# Patient Record
Sex: MALE | Race: WHITE | NOT HISPANIC OR LATINO | Employment: FULL TIME | URBAN - METROPOLITAN AREA
[De-identification: names, ages, dates, MRNs, and addresses within clinical notes are randomized per-mention and may not be internally consistent; named-entity substitution may affect disease eponyms.]

---

## 2017-08-16 ENCOUNTER — HISTORICAL (OUTPATIENT)
Dept: RADIOLOGY | Facility: HOSPITAL | Age: 32
End: 2017-08-16

## 2018-02-07 ENCOUNTER — HISTORICAL (OUTPATIENT)
Dept: INFUSION THERAPY | Facility: HOSPITAL | Age: 33
End: 2018-02-07

## 2019-12-30 ENCOUNTER — HISTORICAL (OUTPATIENT)
Dept: RADIOLOGY | Facility: HOSPITAL | Age: 34
End: 2019-12-30

## 2020-02-28 ENCOUNTER — HISTORICAL (OUTPATIENT)
Dept: RADIOLOGY | Facility: HOSPITAL | Age: 35
End: 2020-02-28

## 2020-04-28 ENCOUNTER — HISTORICAL (OUTPATIENT)
Dept: RADIOLOGY | Facility: HOSPITAL | Age: 35
End: 2020-04-28

## 2020-08-08 ENCOUNTER — HISTORICAL (OUTPATIENT)
Dept: LAB | Facility: HOSPITAL | Age: 35
End: 2020-08-08

## 2020-08-08 LAB
ABS NEUT (OLG): 4.81 X10(3)/MCL (ref 2.1–9.2)
ALBUMIN SERPL-MCNC: 3.7 GM/DL (ref 3.4–5)
ALBUMIN/GLOB SERPL: 0.9 RATIO (ref 1.1–2)
ALP SERPL-CCNC: 61 UNIT/L (ref 46–116)
ALT SERPL-CCNC: 32 UNIT/L (ref 12–78)
AST SERPL-CCNC: 16 UNIT/L (ref 15–37)
BASOPHILS # BLD AUTO: 0 X10(3)/MCL (ref 0–0.2)
BASOPHILS NFR BLD AUTO: 1 %
BILIRUB SERPL-MCNC: 0.3 MG/DL (ref 0.2–1)
BILIRUBIN DIRECT+TOT PNL SERPL-MCNC: 0.12 MG/DL (ref 0–0.2)
BILIRUBIN DIRECT+TOT PNL SERPL-MCNC: 0.18 MG/DL (ref 0–0.8)
BUN SERPL-MCNC: 14.4 MG/DL (ref 7–18)
CALCIUM SERPL-MCNC: 9.7 MG/DL (ref 8.5–10.1)
CHLORIDE SERPL-SCNC: 101 MMOL/L (ref 98–107)
CHOLEST SERPL-MCNC: 164 MG/DL (ref 0–200)
CHOLEST/HDLC SERPL: 3 {RATIO} (ref 0–5)
CO2 SERPL-SCNC: 30.2 MMOL/L (ref 21–32)
CREAT SERPL-MCNC: 0.9 MG/DL (ref 0.6–1.3)
DEPRECATED CALCIDIOL+CALCIFEROL SERPL-MC: 26.9 NG/ML (ref 6.6–49.9)
EOSINOPHIL # BLD AUTO: 0.2 X10(3)/MCL (ref 0–0.9)
EOSINOPHIL NFR BLD AUTO: 3 %
ERYTHROCYTE [DISTWIDTH] IN BLOOD BY AUTOMATED COUNT: 13 % (ref 11.5–17)
GLOBULIN SER-MCNC: 3.9 GM/DL (ref 2.4–3.5)
GLUCOSE SERPL-MCNC: 95 MG/DL (ref 74–106)
HCT VFR BLD AUTO: 39.8 % (ref 42–52)
HDLC SERPL-MCNC: 54 MG/DL (ref 40–60)
HGB BLD-MCNC: 13.1 GM/DL (ref 14–18)
IMM GRANULOCYTES # BLD AUTO: 0.06 % (ref 0–0.02)
IMM GRANULOCYTES NFR BLD AUTO: 0.8 % (ref 0–0.43)
LDLC SERPL CALC-MCNC: 98 MG/DL (ref 0–129)
LYMPHOCYTES # BLD AUTO: 2 X10(3)/MCL (ref 0.6–4.6)
LYMPHOCYTES NFR BLD AUTO: 26 %
MCH RBC QN AUTO: 31.3 PG (ref 27–31)
MCHC RBC AUTO-ENTMCNC: 32.9 GM/DL (ref 33–36)
MCV RBC AUTO: 95.2 FL (ref 80–94)
MONOCYTES # BLD AUTO: 0.6 X10(3)/MCL (ref 0.1–1.3)
MONOCYTES NFR BLD AUTO: 8 %
NEUTROPHILS # BLD AUTO: 4.81 X10(3)/MCL (ref 1.4–7.9)
NEUTROPHILS NFR BLD AUTO: 62 %
PLATELET # BLD AUTO: 277 X10(3)/MCL (ref 130–400)
PMV BLD AUTO: 10.2 FL (ref 9.4–12.4)
POTASSIUM SERPL-SCNC: 4.6 MMOL/L (ref 3.5–5.1)
PROT SERPL-MCNC: 7.6 GM/DL (ref 6.4–8.2)
RBC # BLD AUTO: 4.18 X10(6)/MCL (ref 4.7–6.1)
SODIUM SERPL-SCNC: 137 MMOL/L (ref 136–145)
TRIGL SERPL-MCNC: 62 MG/DL
TSH SERPL-ACNC: 1.54 MIU/ML (ref 0.36–3.74)
VIT B12 SERPL-MCNC: 361 PG/ML (ref 193–986)
VLDLC SERPL CALC-MCNC: 12 MG/DL
WBC # SPEC AUTO: 7.8 X10(3)/MCL (ref 4.5–11.5)

## 2021-05-04 ENCOUNTER — HISTORICAL (OUTPATIENT)
Dept: RADIOLOGY | Facility: HOSPITAL | Age: 36
End: 2021-05-04

## 2022-04-10 ENCOUNTER — HISTORICAL (OUTPATIENT)
Dept: ADMINISTRATIVE | Facility: HOSPITAL | Age: 37
End: 2022-04-10
Payer: COMMERCIAL

## 2022-04-11 ENCOUNTER — HISTORICAL (OUTPATIENT)
Dept: ADMINISTRATIVE | Facility: HOSPITAL | Age: 37
End: 2022-04-11
Payer: COMMERCIAL

## 2022-04-28 VITALS
BODY MASS INDEX: 44.1 KG/M2 | SYSTOLIC BLOOD PRESSURE: 137 MMHG | WEIGHT: 315 LBS | HEIGHT: 71 IN | DIASTOLIC BLOOD PRESSURE: 86 MMHG | OXYGEN SATURATION: 98 %

## 2022-04-28 VITALS
SYSTOLIC BLOOD PRESSURE: 137 MMHG | WEIGHT: 315 LBS | HEIGHT: 71 IN | OXYGEN SATURATION: 98 % | BODY MASS INDEX: 44.1 KG/M2 | DIASTOLIC BLOOD PRESSURE: 86 MMHG

## 2022-05-04 DIAGNOSIS — F32.A DEPRESSION, UNSPECIFIED DEPRESSION TYPE: Primary | ICD-10-CM

## 2022-05-04 DIAGNOSIS — G47.00 INSOMNIA, UNSPECIFIED TYPE: ICD-10-CM

## 2022-05-04 DIAGNOSIS — I10 HYPERTENSION, UNSPECIFIED TYPE: ICD-10-CM

## 2022-05-04 DIAGNOSIS — R52 PAIN: ICD-10-CM

## 2022-05-04 RX ORDER — IBUPROFEN 800 MG/1
800 TABLET ORAL EVERY 8 HOURS PRN
Qty: 30 TABLET | Refills: 4 | Status: SHIPPED | OUTPATIENT
Start: 2022-05-04 | End: 2023-05-15 | Stop reason: SDUPTHER

## 2022-05-04 RX ORDER — ESCITALOPRAM OXALATE 20 MG/1
20 TABLET ORAL DAILY
Qty: 30 TABLET | Refills: 11 | Status: SHIPPED | OUTPATIENT
Start: 2022-05-04 | End: 2023-04-28

## 2022-05-04 RX ORDER — INDOMETHACIN 25 MG/1
CAPSULE ORAL
COMMUNITY
Start: 2021-11-10 | End: 2022-08-05 | Stop reason: ALTCHOICE

## 2022-05-04 RX ORDER — LISINOPRIL AND HYDROCHLOROTHIAZIDE 20; 25 MG/1; MG/1
TABLET ORAL
COMMUNITY
Start: 2021-05-05 | End: 2022-05-04 | Stop reason: SDUPTHER

## 2022-05-04 RX ORDER — LISINOPRIL AND HYDROCHLOROTHIAZIDE 20; 25 MG/1; MG/1
1 TABLET ORAL DAILY
Qty: 30 TABLET | Refills: 11 | Status: SHIPPED | OUTPATIENT
Start: 2022-05-04 | End: 2023-05-15 | Stop reason: SDUPTHER

## 2022-05-04 RX ORDER — TRAZODONE HYDROCHLORIDE 150 MG/1
150 TABLET ORAL NIGHTLY
Qty: 30 TABLET | Refills: 11 | Status: SHIPPED | OUTPATIENT
Start: 2022-05-04 | End: 2023-04-28

## 2022-05-04 RX ORDER — IBUPROFEN 800 MG/1
TABLET ORAL
COMMUNITY
Start: 2021-05-10 | End: 2022-05-04 | Stop reason: SDUPTHER

## 2022-05-04 RX ORDER — TRAZODONE HYDROCHLORIDE 150 MG/1
TABLET ORAL
COMMUNITY
Start: 2021-09-13 | End: 2022-05-04 | Stop reason: SDUPTHER

## 2022-05-04 RX ORDER — ESCITALOPRAM OXALATE 20 MG/1
20 TABLET ORAL
COMMUNITY
Start: 2022-01-03 | End: 2022-05-04 | Stop reason: SDUPTHER

## 2022-05-20 DIAGNOSIS — Z00.00 WELLNESS EXAMINATION: Primary | ICD-10-CM

## 2022-07-17 ENCOUNTER — HOSPITAL ENCOUNTER (EMERGENCY)
Facility: HOSPITAL | Age: 37
Discharge: HOME | End: 2022-07-17
Attending: FAMILY MEDICINE
Payer: MEDICAID

## 2022-07-17 VITALS
SYSTOLIC BLOOD PRESSURE: 143 MMHG | OXYGEN SATURATION: 95 % | RESPIRATION RATE: 16 BRPM | TEMPERATURE: 98 F | DIASTOLIC BLOOD PRESSURE: 78 MMHG | HEART RATE: 92 BPM

## 2022-07-17 DIAGNOSIS — S93.402A SPRAIN OF LEFT ANKLE, UNSPECIFIED LIGAMENT, INITIAL ENCOUNTER: Primary | ICD-10-CM

## 2022-07-17 DIAGNOSIS — R52 PAIN: ICD-10-CM

## 2022-07-17 PROCEDURE — 99283 EMERGENCY DEPT VISIT LOW MDM: CPT | Mod: 25

## 2022-07-18 NOTE — ED PROVIDER NOTES
Encounter Date: 7/17/2022       History     Chief Complaint   Patient presents with    Ankle Injury     C/o left ankle injury fell through a grating superficial abrasions noted      36-year-old female patient comes in with complaint ankle pain after falling through grating patient otherwise has mild abrasions no evidence of lacerations minimal swelling no other signs of injury.        Review of patient's allergies indicates:  No Known Allergies  No past medical history on file.  No past surgical history on file.  No family history on file.     Review of Systems   Constitutional: Negative for fever.   HENT: Negative for sore throat.    Respiratory: Negative for shortness of breath.    Cardiovascular: Negative for chest pain.   Gastrointestinal: Negative for nausea.   Genitourinary: Negative for dysuria.   Musculoskeletal: Positive for arthralgias and myalgias. Negative for back pain.   Skin: Negative for rash.   Neurological: Negative for weakness.   Hematological: Does not bruise/bleed easily.   All other systems reviewed and are negative.      Physical Exam     Initial Vitals [07/17/22 2155]   BP Pulse Resp Temp SpO2   (!) 143/78 92 16 98.1 °F (36.7 °C) 95 %      MAP       --         Physical Exam    Nursing note and vitals reviewed.  Constitutional: He appears well-developed and well-nourished. He is not diaphoretic. No distress.   HENT:   Head: Normocephalic and atraumatic.   Right Ear: External ear normal.   Left Ear: External ear normal.   Nose: Nose normal.   Mouth/Throat: Oropharynx is clear and moist. No oropharyngeal exudate.   Eyes: Conjunctivae and EOM are normal. Pupils are equal, round, and reactive to light. Right eye exhibits no discharge. Left eye exhibits no discharge.   Neck: Neck supple. No thyromegaly present. No tracheal deviation present. No JVD present.   Normal range of motion.  Cardiovascular: Normal rate, regular rhythm, normal heart sounds and intact distal pulses. Exam reveals no gallop  and no friction rub.    No murmur heard.  Pulmonary/Chest: Breath sounds normal. No stridor. No respiratory distress. He has no wheezes. He has no rhonchi. He has no rales. He exhibits no tenderness.   Abdominal: Abdomen is soft. Bowel sounds are normal. He exhibits no distension. There is no abdominal tenderness. There is no rebound and no guarding.   Musculoskeletal:         General: Tenderness present. No edema. Normal range of motion.      Cervical back: Normal range of motion and neck supple.     Lymphadenopathy:     He has no cervical adenopathy.   Neurological: He is alert and oriented to person, place, and time. He has normal strength and normal reflexes. No cranial nerve deficit or sensory deficit. GCS score is 15. GCS eye subscore is 4. GCS verbal subscore is 5. GCS motor subscore is 6.   Skin: Skin is warm and dry. No rash and no abscess noted. There is erythema.   Psychiatric: He has a normal mood and affect. His behavior is normal. Judgment and thought content normal.         ED Course   Procedures  Labs Reviewed - No data to display       Imaging Results          X-Ray Ankle Complete Left (Preliminary result)  Result time 07/17/22 22:23:58    ED Interpretation by Óscar Rashid MD (07/17/22 22:23:58, [facility] - Emergency Dept, Emergency Medicine)    .sdra                               Medications - No data to display                       Clinical Impression:   Final diagnoses:  [R52] Pain  [S93.402A] Sprain of left ankle, unspecified ligament, initial encounter (Primary)          ED Disposition Condition    Discharge Stable        ED Prescriptions     None        Follow-up Information    None          Óscar Rashid MD  07/17/22 9623

## 2022-08-05 ENCOUNTER — HOSPITAL ENCOUNTER (EMERGENCY)
Facility: HOSPITAL | Age: 37
Discharge: HOME | End: 2022-08-05
Attending: STUDENT IN AN ORGANIZED HEALTH CARE EDUCATION/TRAINING PROGRAM
Payer: MEDICAID

## 2022-08-05 VITALS
SYSTOLIC BLOOD PRESSURE: 123 MMHG | HEIGHT: 69 IN | RESPIRATION RATE: 18 BRPM | BODY MASS INDEX: 46.65 KG/M2 | HEART RATE: 73 BPM | WEIGHT: 315 LBS | OXYGEN SATURATION: 98 % | TEMPERATURE: 98 F | DIASTOLIC BLOOD PRESSURE: 81 MMHG

## 2022-08-05 DIAGNOSIS — S39.012A LUMBAR STRAIN, INITIAL ENCOUNTER: Primary | ICD-10-CM

## 2022-08-05 PROCEDURE — 96372 THER/PROPH/DIAG INJ SC/IM: CPT | Performed by: STUDENT IN AN ORGANIZED HEALTH CARE EDUCATION/TRAINING PROGRAM

## 2022-08-05 PROCEDURE — 99284 EMERGENCY DEPT VISIT MOD MDM: CPT | Mod: 25

## 2022-08-05 PROCEDURE — 63600175 PHARM REV CODE 636 W HCPCS: Performed by: STUDENT IN AN ORGANIZED HEALTH CARE EDUCATION/TRAINING PROGRAM

## 2022-08-05 RX ORDER — KETOROLAC TROMETHAMINE 30 MG/ML
30 INJECTION, SOLUTION INTRAMUSCULAR; INTRAVENOUS
Status: COMPLETED | OUTPATIENT
Start: 2022-08-05 | End: 2022-08-05

## 2022-08-05 RX ORDER — IBUPROFEN 600 MG/1
600 TABLET ORAL 3 TIMES DAILY
Qty: 15 TABLET | Refills: 0 | Status: SHIPPED | OUTPATIENT
Start: 2022-08-05 | End: 2022-08-10

## 2022-08-05 RX ORDER — METHOCARBAMOL 750 MG/1
1500 TABLET, FILM COATED ORAL 2 TIMES DAILY PRN
Qty: 20 TABLET | Refills: 0 | Status: SHIPPED | OUTPATIENT
Start: 2022-08-05 | End: 2022-08-10

## 2022-08-05 RX ADMIN — KETOROLAC TROMETHAMINE 30 MG: 30 INJECTION, SOLUTION INTRAMUSCULAR; INTRAVENOUS at 08:08

## 2022-08-05 NOTE — ED PROVIDER NOTES
"Encounter Date: 8/5/2022       History     Chief Complaint   Patient presents with    Back Pain     Slip and fall Monday felt a pop now having lower left back pain     Pt is a 36-year-old white male past medical history of hypertension morbid obesity who presented to the ER today due to left lower back pain for the last 2 days.  Patient states he works as a  and states that he fell in a flower bed.  Patient states he had no pain at the time body they did feel his back "pop.  Patient states he has no pain at rest and only pain with movement.  Patient states the pain does not radiate.  Patient describes sharp pain.  Patient denies any urinary incontinence, urinary retention, fecal incontinence, saddle anesthesia.  Patient rates the pain an 8/10 at its worst.  Patient has taken Tylenol for without much relief.  Patient states he has been applying icy out with better relief than Tylenol.  Patient denies any fevers, chills, cough, congestion, chest pain, shortness of breath, abdominal pain.        Review of patient's allergies indicates:  No Known Allergies  No past medical history on file.  No past surgical history on file.  No family history on file.  Social History     Tobacco Use    Smoking status: Never Smoker    Smokeless tobacco: Never Used   Substance Use Topics    Alcohol use: Yes    Drug use: Never     Review of Systems   Constitutional: Negative for chills, fatigue and fever.   HENT: Negative for congestion, sore throat and trouble swallowing.    Eyes: Negative for pain and visual disturbance.   Respiratory: Negative for cough, shortness of breath and wheezing.    Cardiovascular: Negative for chest pain and palpitations.   Gastrointestinal: Negative for abdominal pain, blood in stool, constipation, diarrhea, nausea and vomiting.   Genitourinary: Negative for dysuria and hematuria.   Musculoskeletal: Positive for back pain. Negative for myalgias.   Skin: Negative for rash and wound. "   Neurological: Negative for seizures, syncope and headaches.   Psychiatric/Behavioral: Negative for confusion. The patient is not nervous/anxious.        Physical Exam     Initial Vitals [08/05/22 0803]   BP Pulse Resp Temp SpO2   123/81 73 18 97.8 °F (36.6 °C) 98 %      MAP       --         Physical Exam    Nursing note and vitals reviewed.  Constitutional: He appears well-developed and well-nourished. No distress.   HENT:   Head: Normocephalic and atraumatic.   Eyes: Conjunctivae and EOM are normal. Right eye exhibits no discharge. Left eye exhibits no discharge. No scleral icterus.   Neck: No tracheal deviation present.   Normal range of motion.  Cardiovascular: Normal rate, regular rhythm, normal heart sounds and intact distal pulses. Exam reveals no gallop and no friction rub.    No murmur heard.  Pulmonary/Chest: Breath sounds normal. No respiratory distress. He has no wheezes. He has no rhonchi. He has no rales.   Musculoskeletal:         General: Tenderness present. No edema. Normal range of motion.      Cervical back: Normal range of motion.      Comments: No C, T, L-spine tenderness no step-off lesions.  No obvious signs of trauma or deformity on exam.  No rashes.  Tenderness palpation over the L1-L3 left paraspinal muscle region.     Neurological: He is alert. He has normal strength. No cranial nerve deficit.   Skin: Skin is warm and dry. No rash and no abscess noted. No erythema. No pallor.   Psychiatric: His behavior is normal. Judgment normal.         ED Course   Procedures  Labs Reviewed - No data to display       Imaging Results          X-Ray Lumbar Spine Ap And Lateral (Final result)  Result time 08/05/22 09:27:42    Final result by Johnna Encarnacion MD (08/05/22 09:27:42)                 Impression:      No appreciable acute osseous abnormality by plain radiographic evaluation.      Electronically signed by: Johnna Encarnacion  Date:    08/05/2022  Time:    09:27             Narrative:     EXAMINATION:  XR LUMBAR SPINE AP AND LATERAL    CLINICAL HISTORY:  back pain.  TTP Left lumbar paraspinal;    TECHNIQUE:  Three views of the lumbar spine were performed.    COMPARISON:  None.    FINDINGS:  BONES: Vertebral body heights are maintained. Alignment is normal.    DISCS: Disc heights are preserved.    SOFT TISSUES: Regional soft tissues unremarkable.                                 Medications   ketorolac injection 30 mg (30 mg Intramuscular Given 8/5/22 0825)     Medical Decision Making:   Initial Assessment:   Overall well-appearing 36-year-old obese male  Differential Diagnosis:   Lumbar strain, fracture, tendon rupture  ED Management:  Vital signs stable  Denies or red flag symptoms of back pain  Tenderness palpation of the paraspinal muscle region consistent with lumbar strain  X-ray showed no acute osseous abnormalities  Toradol gave significant relief  Will continue ibuprofen 600 mg t.i.d. for 5 days  Robaxin p.r.n.  Rice therapy advised  Return precautions discussed follow-up PCP is recommended                      Clinical Impression:   Final diagnoses:  [S39.012A] Lumbar strain, initial encounter (Primary)          ED Disposition Condition    Discharge Stable        ED Prescriptions     Medication Sig Dispense Start Date End Date Auth. Provider    ibuprofen (ADVIL,MOTRIN) 600 MG tablet Take 1 tablet (600 mg total) by mouth 3 (three) times daily. for 5 days 15 tablet 8/5/2022 8/10/2022 Clemente Acosta MD    methocarbamoL (ROBAXIN) 750 MG Tab Take 2 tablets (1,500 mg total) by mouth 2 (two) times daily as needed (pain). 20 tablet 8/5/2022 8/10/2022 Clemente Acosta MD        Follow-up Information     Follow up With Specialties Details Why Contact Info    [facility] Emergency Medicine  If symptoms worsen [address] [id number] [phone]    AYANA Lopez Nurse Practitioner  If symptoms worsen [address] [facility]  [address]  [place]  [place] [address]  [phone]             Clemente Acosta MD  08/05/22 0910

## 2022-10-24 DIAGNOSIS — Z00.00 WELLNESS EXAMINATION: Primary | ICD-10-CM

## 2022-10-31 ENCOUNTER — LAB VISIT (OUTPATIENT)
Dept: LAB | Facility: HOSPITAL | Age: 37
End: 2022-10-31
Attending: NURSE PRACTITIONER
Payer: MEDICAID

## 2022-10-31 DIAGNOSIS — Z00.00 WELLNESS EXAMINATION: ICD-10-CM

## 2022-10-31 LAB
ALBUMIN SERPL-MCNC: 3.6 GM/DL (ref 3.5–5)
ALBUMIN/GLOB SERPL: 1.1 RATIO (ref 1.1–2)
ALP SERPL-CCNC: 59 UNIT/L (ref 40–150)
ALT SERPL-CCNC: 43 UNIT/L (ref 0–55)
AST SERPL-CCNC: 24 UNIT/L (ref 5–34)
BASOPHILS # BLD AUTO: 0.04 X10(3)/MCL (ref 0–0.2)
BASOPHILS NFR BLD AUTO: 0.5 %
BILIRUBIN DIRECT+TOT PNL SERPL-MCNC: 0.4 MG/DL
BUN SERPL-MCNC: 11.3 MG/DL (ref 8.9–20.6)
CALCIUM SERPL-MCNC: 9.1 MG/DL (ref 8.4–10.2)
CHLORIDE SERPL-SCNC: 98 MMOL/L (ref 98–107)
CHOLEST SERPL-MCNC: 159 MG/DL
CHOLEST/HDLC SERPL: 4 {RATIO} (ref 0–5)
CO2 SERPL-SCNC: 29 MMOL/L (ref 22–29)
CREAT SERPL-MCNC: 0.98 MG/DL (ref 0.73–1.18)
CREAT UR-MCNC: 97.2 MG/DL (ref 63–166)
DEPRECATED CALCIDIOL+CALCIFEROL SERPL-MC: 29.3 NG/ML (ref 30–80)
EOSINOPHIL # BLD AUTO: 0.22 X10(3)/MCL (ref 0–0.9)
EOSINOPHIL NFR BLD AUTO: 2.5 %
ERYTHROCYTE [DISTWIDTH] IN BLOOD BY AUTOMATED COUNT: 13.3 % (ref 11.5–17)
EST. AVERAGE GLUCOSE BLD GHB EST-MCNC: 111.2 MG/DL
GFR SERPLBLD CREATININE-BSD FMLA CKD-EPI: >60 MLS/MIN/1.73/M2
GLOBULIN SER-MCNC: 3.4 GM/DL (ref 2.4–3.5)
GLUCOSE SERPL-MCNC: 100 MG/DL (ref 74–100)
HBA1C MFR BLD: 5.5 %
HCT VFR BLD AUTO: 42.5 % (ref 42–52)
HDLC SERPL-MCNC: 45 MG/DL (ref 35–60)
HGB BLD-MCNC: 13.3 GM/DL (ref 14–18)
IMM GRANULOCYTES # BLD AUTO: 0.06 X10(3)/MCL (ref 0–0.04)
IMM GRANULOCYTES NFR BLD AUTO: 0.7 %
LDLC SERPL CALC-MCNC: 88 MG/DL (ref 50–140)
LYMPHOCYTES # BLD AUTO: 2.07 X10(3)/MCL (ref 0.6–4.6)
LYMPHOCYTES NFR BLD AUTO: 24 %
MCH RBC QN AUTO: 31.4 PG (ref 27–31)
MCHC RBC AUTO-ENTMCNC: 31.3 MG/DL (ref 33–36)
MCV RBC AUTO: 100.5 FL (ref 80–94)
MICROALBUMIN UR-MCNC: 13.7 UG/ML
MICROALBUMIN/CREAT RATIO PNL UR: 14.1 MG/GM CR (ref 0–30)
MONOCYTES # BLD AUTO: 0.64 X10(3)/MCL (ref 0.1–1.3)
MONOCYTES NFR BLD AUTO: 7.4 %
NEUTROPHILS # BLD AUTO: 5.6 X10(3)/MCL (ref 2.1–9.2)
NEUTROPHILS NFR BLD AUTO: 64.9 %
PLATELET # BLD AUTO: 250 X10(3)/MCL (ref 130–400)
PMV BLD AUTO: 10.1 FL (ref 7.4–10.4)
POTASSIUM SERPL-SCNC: 4.2 MMOL/L (ref 3.5–5.1)
PROT SERPL-MCNC: 7 GM/DL (ref 6.4–8.3)
RBC # BLD AUTO: 4.23 X10(6)/MCL (ref 4.7–6.1)
SODIUM SERPL-SCNC: 138 MMOL/L (ref 136–145)
TRIGL SERPL-MCNC: 128 MG/DL (ref 34–140)
TSH SERPL-ACNC: 2.41 UIU/ML (ref 0.35–4.94)
VLDLC SERPL CALC-MCNC: 26 MG/DL
WBC # SPEC AUTO: 8.6 X10(3)/MCL (ref 4.5–11.5)

## 2022-10-31 PROCEDURE — 82306 VITAMIN D 25 HYDROXY: CPT

## 2022-10-31 PROCEDURE — 36415 COLL VENOUS BLD VENIPUNCTURE: CPT

## 2022-10-31 PROCEDURE — 84443 ASSAY THYROID STIM HORMONE: CPT

## 2022-10-31 PROCEDURE — 85025 COMPLETE CBC W/AUTO DIFF WBC: CPT

## 2022-10-31 PROCEDURE — 80053 COMPREHEN METABOLIC PANEL: CPT

## 2022-10-31 PROCEDURE — 83036 HEMOGLOBIN GLYCOSYLATED A1C: CPT

## 2022-10-31 PROCEDURE — 82043 UR ALBUMIN QUANTITATIVE: CPT

## 2022-10-31 PROCEDURE — 80061 LIPID PANEL: CPT

## 2022-11-01 ENCOUNTER — OFFICE VISIT (OUTPATIENT)
Dept: FAMILY MEDICINE | Facility: CLINIC | Age: 37
End: 2022-11-01
Payer: MEDICAID

## 2022-11-01 VITALS
SYSTOLIC BLOOD PRESSURE: 100 MMHG | WEIGHT: 315 LBS | DIASTOLIC BLOOD PRESSURE: 62 MMHG | TEMPERATURE: 98 F | BODY MASS INDEX: 46.65 KG/M2 | OXYGEN SATURATION: 95 % | RESPIRATION RATE: 18 BRPM | HEART RATE: 82 BPM | HEIGHT: 69 IN

## 2022-11-01 DIAGNOSIS — Z79.890 ENCOUNTER FOR MONITORING TESTOSTERONE REPLACEMENT THERAPY: Primary | ICD-10-CM

## 2022-11-01 DIAGNOSIS — Z51.81 ENCOUNTER FOR MONITORING TESTOSTERONE REPLACEMENT THERAPY: Primary | ICD-10-CM

## 2022-11-01 DIAGNOSIS — Z00.00 WELLNESS EXAMINATION: ICD-10-CM

## 2022-11-01 DIAGNOSIS — E55.9 VITAMIN D DEFICIENCY: ICD-10-CM

## 2022-11-01 DIAGNOSIS — G47.30 SLEEP APNEA, UNSPECIFIED TYPE: ICD-10-CM

## 2022-11-01 DIAGNOSIS — R53.83 FATIGUE, UNSPECIFIED TYPE: ICD-10-CM

## 2022-11-01 PROCEDURE — 1160F RVW MEDS BY RX/DR IN RCRD: CPT | Mod: CPTII,,, | Performed by: NURSE PRACTITIONER

## 2022-11-01 PROCEDURE — 3078F PR MOST RECENT DIASTOLIC BLOOD PRESSURE < 80 MM HG: ICD-10-PCS | Mod: CPTII,,, | Performed by: NURSE PRACTITIONER

## 2022-11-01 PROCEDURE — 99395 PR PREVENTIVE VISIT,EST,18-39: ICD-10-PCS | Mod: ,,, | Performed by: NURSE PRACTITIONER

## 2022-11-01 PROCEDURE — 99212 OFFICE O/P EST SF 10 MIN: CPT | Mod: 25,,, | Performed by: NURSE PRACTITIONER

## 2022-11-01 PROCEDURE — 3008F PR BODY MASS INDEX (BMI) DOCUMENTED: ICD-10-PCS | Mod: CPTII,,, | Performed by: NURSE PRACTITIONER

## 2022-11-01 PROCEDURE — 3074F PR MOST RECENT SYSTOLIC BLOOD PRESSURE < 130 MM HG: ICD-10-PCS | Mod: CPTII,,, | Performed by: NURSE PRACTITIONER

## 2022-11-01 PROCEDURE — 99395 PREV VISIT EST AGE 18-39: CPT | Mod: ,,, | Performed by: NURSE PRACTITIONER

## 2022-11-01 PROCEDURE — 4010F PR ACE/ARB THEARPY RXD/TAKEN: ICD-10-PCS | Mod: CPTII,,, | Performed by: NURSE PRACTITIONER

## 2022-11-01 PROCEDURE — 99212 PR OFFICE/OUTPT VISIT, EST, LEVL II, 10-19 MIN: ICD-10-PCS | Mod: 25,,, | Performed by: NURSE PRACTITIONER

## 2022-11-01 PROCEDURE — 1160F PR REVIEW ALL MEDS BY PRESCRIBER/CLIN PHARMACIST DOCUMENTED: ICD-10-PCS | Mod: CPTII,,, | Performed by: NURSE PRACTITIONER

## 2022-11-01 PROCEDURE — 1159F MED LIST DOCD IN RCRD: CPT | Mod: CPTII,,, | Performed by: NURSE PRACTITIONER

## 2022-11-01 PROCEDURE — 4010F ACE/ARB THERAPY RXD/TAKEN: CPT | Mod: CPTII,,, | Performed by: NURSE PRACTITIONER

## 2022-11-01 PROCEDURE — 1159F PR MEDICATION LIST DOCUMENTED IN MEDICAL RECORD: ICD-10-PCS | Mod: CPTII,,, | Performed by: NURSE PRACTITIONER

## 2022-11-01 PROCEDURE — 3074F SYST BP LT 130 MM HG: CPT | Mod: CPTII,,, | Performed by: NURSE PRACTITIONER

## 2022-11-01 PROCEDURE — 3078F DIAST BP <80 MM HG: CPT | Mod: CPTII,,, | Performed by: NURSE PRACTITIONER

## 2022-11-01 PROCEDURE — 3008F BODY MASS INDEX DOCD: CPT | Mod: CPTII,,, | Performed by: NURSE PRACTITIONER

## 2022-11-01 RX ORDER — ASPIRIN 325 MG
50000 TABLET, DELAYED RELEASE (ENTERIC COATED) ORAL WEEKLY
Qty: 12 CAPSULE | Refills: 0 | Status: SHIPPED | OUTPATIENT
Start: 2022-11-01 | End: 2023-01-18

## 2022-11-01 NOTE — PROGRESS NOTES
ANNUAL WELLNESS NOTE    Chief Complaint:  Annual Exam, Anxiety, Joint Swelling (Occasional swelling to L knee), Irregular Heart Beat (Feel like heart skips beats at times and also races at times.), and Apnea     HPI:  Homero Crocker is a 37 y.o. male who presents to the clinic for a wellness exam lab review.    ----------------------------  Anemia, unspecified  Bilateral lower extremity edema  Body mass index (BMI) 50.0-59.9, adult  Decreased libido  Essential (primary) hypertension  Gout, unspecified  Male hypogonadism  Morbid (severe) obesity due to excess calories  Personal history of nicotine dependence    Patient Care Team:  AYANA Lopez as PCP - General (Nurse Practitioner)    Nursing Assessments and Health Risk Assessment:  No flowsheet data found.  Fall Risk Assessment - Outpatient 11/1/2022   Mobility Status Ambulatory   Number of falls 0   Identified as fall risk 0       Review of Systems   Constitutional: Negative.    HENT: Negative.     Eyes: Negative.    Respiratory:  Positive for apnea.         Snoring   Cardiovascular: Negative.    Gastrointestinal: Negative.    Endocrine: Negative.    Genitourinary: Negative.    Musculoskeletal: Negative.    Integumentary:  Negative.   Allergic/Immunologic: Negative.    Neurological: Negative.    Hematological: Negative.    Psychiatric/Behavioral: Negative.       Physical Exam  Vitals and nursing note reviewed.   Constitutional:       Appearance: Normal appearance.   HENT:      Head: Normocephalic and atraumatic.      Right Ear: Ear canal and external ear normal.      Left Ear: Ear canal and external ear normal.      Nose: Nose normal.      Mouth/Throat:      Mouth: Mucous membranes are moist.      Pharynx: Oropharynx is clear.   Eyes:      Extraocular Movements: Extraocular movements intact.      Conjunctiva/sclera: Conjunctivae normal.      Pupils: Pupils are equal, round, and reactive to light.   Cardiovascular:      Rate and Rhythm: Normal rate and  regular rhythm.      Pulses: Normal pulses.      Heart sounds: Normal heart sounds.   Pulmonary:      Effort: Pulmonary effort is normal.      Breath sounds: Normal breath sounds.   Abdominal:      General: Abdomen is flat. Bowel sounds are normal.      Palpations: Abdomen is soft.   Musculoskeletal:         General: Normal range of motion.      Cervical back: Normal range of motion and neck supple.   Skin:     General: Skin is warm and dry.      Capillary Refill: Capillary refill takes less than 2 seconds.   Neurological:      General: No focal deficit present.      Mental Status: He is alert and oriented to person, place, and time. Mental status is at baseline.   Psychiatric:         Mood and Affect: Mood normal.         Behavior: Behavior normal.         Thought Content: Thought content normal.         Judgment: Judgment normal.     Results for orders placed or performed in visit on 10/31/22   Vitamin D   Result Value Ref Range    Vit D 25 OH 29.3 (L) 30.0 - 80.0 ng/mL   Comprehensive Metabolic Panel   Result Value Ref Range    Sodium Level 138 136 - 145 mmol/L    Potassium Level 4.2 3.5 - 5.1 mmol/L    Chloride 98 98 - 107 mmol/L    Carbon Dioxide 29 22 - 29 mmol/L    Glucose Level 100 74 - 100 mg/dL    Blood Urea Nitrogen 11.3 8.9 - 20.6 mg/dL    Creatinine 0.98 0.73 - 1.18 mg/dL    Calcium Level Total 9.1 8.4 - 10.2 mg/dL    Protein Total 7.0 6.4 - 8.3 gm/dL    Albumin Level 3.6 3.5 - 5.0 gm/dL    Globulin 3.4 2.4 - 3.5 gm/dL    Albumin/Globulin Ratio 1.1 1.1 - 2.0 ratio    Bilirubin Total 0.4 <=1.5 mg/dL    Alkaline Phosphatase 59 40 - 150 unit/L    Alanine Aminotransferase 43 0 - 55 unit/L    Aspartate Aminotransferase 24 5 - 34 unit/L    eGFR >60 mls/min/1.73/m2   Lipid Panel   Result Value Ref Range    Cholesterol Total 159 <=200 mg/dL    HDL Cholesterol 45 35 - 60 mg/dL    Triglyceride 128 34 - 140 mg/dL    Cholesterol/HDL Ratio 4 0 - 5    Very Low Density Lipoprotein 26     LDL Cholesterol 88.00 50.00 -  140.00 mg/dL   Microalbumin/Creatinine Ratio, Urine   Result Value Ref Range    Urine Microalbumin 13.7 <=30.0 ug/ml    Urine Creatinine 97.2 63.0 - 166.0 mg/dL    Microalbumin Creatinine Ratio 14.1 0.0 - 30.0 mg/gm Cr   TSH   Result Value Ref Range    Thyroid Stimulating Hormone 2.4060 0.3500 - 4.9400 uIU/mL   Hemoglobin A1C   Result Value Ref Range    Hemoglobin A1c 5.5 <=7.0 %    Estimated Average Glucose 111.2 mg/dL   CBC with Differential   Result Value Ref Range    WBC 8.6 4.5 - 11.5 x10(3)/mcL    RBC 4.23 (L) 4.70 - 6.10 x10(6)/mcL    Hgb 13.3 (L) 14.0 - 18.0 gm/dL    Hct 42.5 42.0 - 52.0 %    .5 (H) 80.0 - 94.0 fL    MCH 31.4 (H) 27.0 - 31.0 pg    MCHC 31.3 (L) 33.0 - 36.0 mg/dL    RDW 13.3 11.5 - 17.0 %    Platelet 250 130 - 400 x10(3)/mcL    MPV 10.1 7.4 - 10.4 fL    Neut % 64.9 %    Lymph % 24.0 %    Mono % 7.4 %    Eos % 2.5 %    Basophil % 0.5 %    Lymph # 2.07 0.6 - 4.6 x10(3)/mcL    Neut # 5.6 2.1 - 9.2 x10(3)/mcL    Mono # 0.64 0.1 - 1.3 x10(3)/mcL    Eos # 0.22 0 - 0.9 x10(3)/mcL    Baso # 0.04 0 - 0.2 x10(3)/mcL    IG# 0.06 (H) 0 - 0.04 x10(3)/mcL    IG% 0.7 %         Health Maintenance:  Health Maintenance Topics with due status: Not Due       Topic Last Completion Date    Lipid Panel 10/31/2022     Health Maintenance Due   Topic Date Due    Hepatitis C Screening  Never done    HIV Screening  Never done    TETANUS VACCINE  10/20/2014    COVID-19 Vaccine (3 - Booster for Moderna series) 10/20/2021    Influenza Vaccine (1) 09/01/2022       Assessment/Plan:  1. Encounter for monitoring testosterone replacement therapy  -     Ambulatory referral/consult to Urology; Future; Expected date: 11/08/2022    2. Wellness examination    3. Vitamin D deficiency  -     cholecalciferol, vitamin D3, 1,250 mcg (50,000 unit) capsule; Take 1 capsule (50,000 Units total) by mouth once a week. for 12 doses  Dispense: 12 capsule; Refill: 0    4. Fatigue, unspecified type  -     Testosterone; Future; Expected  date: 11/01/2022    5. Sleep apnea, unspecified type  -     Ambulatory referral/consult to Sleep Disorders; Future; Expected date: 11/08/2022          RTC in 6 months for follow up appt.

## 2023-02-06 PROBLEM — Z00.00 WELLNESS EXAMINATION: Status: RESOLVED | Noted: 2022-11-01 | Resolved: 2023-02-06

## 2023-04-28 DIAGNOSIS — F32.A DEPRESSION, UNSPECIFIED DEPRESSION TYPE: ICD-10-CM

## 2023-04-28 DIAGNOSIS — G47.00 INSOMNIA, UNSPECIFIED TYPE: ICD-10-CM

## 2023-04-28 RX ORDER — ESCITALOPRAM OXALATE 20 MG/1
TABLET ORAL
Qty: 30 TABLET | Refills: 11 | Status: SHIPPED | OUTPATIENT
Start: 2023-04-28 | End: 2023-09-14 | Stop reason: SDUPTHER

## 2023-04-28 RX ORDER — TRAZODONE HYDROCHLORIDE 150 MG/1
TABLET ORAL
Qty: 30 TABLET | Refills: 11 | Status: SHIPPED | OUTPATIENT
Start: 2023-04-28

## 2023-05-01 ENCOUNTER — TELEPHONE (OUTPATIENT)
Dept: FAMILY MEDICINE | Facility: CLINIC | Age: 38
End: 2023-05-01
Payer: MEDICAID

## 2023-05-01 NOTE — TELEPHONE ENCOUNTER
Patient Centered Pre Visit Workflow:    Pre-Visit Chart Review-       Target Diagnosis: 6 MO FU       Outstanding and referrals?  12/08/2022 Sleep Referral to [facility] NO SHOW to 01/05/2023 appt...not reschduled  12/08/2022 Urology Internal Referral done-NO SHOW    Lab work/Tests Needed: Y     Seen in ER, urgent care clinic, or been admitted since last visit?N    Seen any other health care providers since last visit?N      Health Maintenance reviewed and updated:   Health Maintenance Due   Topic Date Due    Hepatitis C Screening  Never done    HIV Screening  Never done    TETANUS VACCINE  10/20/2014    COVID-19 Vaccine (3 - Booster for Moderna series) 10/20/2021            Wellness: 10/2023      Patient Notified:

## 2023-05-15 ENCOUNTER — TELEPHONE (OUTPATIENT)
Dept: FAMILY MEDICINE | Facility: CLINIC | Age: 38
End: 2023-05-15
Payer: MEDICAID

## 2023-05-15 DIAGNOSIS — I10 HYPERTENSION, UNSPECIFIED TYPE: ICD-10-CM

## 2023-05-15 DIAGNOSIS — R52 PAIN: ICD-10-CM

## 2023-05-15 RX ORDER — IBUPROFEN 800 MG/1
800 TABLET ORAL EVERY 8 HOURS PRN
Qty: 30 TABLET | Refills: 4 | Status: SHIPPED | OUTPATIENT
Start: 2023-05-15 | End: 2023-05-17 | Stop reason: SDUPTHER

## 2023-05-15 RX ORDER — LISINOPRIL AND HYDROCHLOROTHIAZIDE 20; 25 MG/1; MG/1
1 TABLET ORAL DAILY
Qty: 30 TABLET | Refills: 11 | Status: SHIPPED | OUTPATIENT
Start: 2023-05-15 | End: 2023-05-17 | Stop reason: SDUPTHER

## 2023-05-17 DIAGNOSIS — I10 HYPERTENSION, UNSPECIFIED TYPE: ICD-10-CM

## 2023-05-17 DIAGNOSIS — R52 PAIN: ICD-10-CM

## 2023-05-17 RX ORDER — IBUPROFEN 800 MG/1
800 TABLET ORAL EVERY 8 HOURS PRN
Qty: 30 TABLET | Refills: 4 | Status: SHIPPED | OUTPATIENT
Start: 2023-05-17

## 2023-05-17 RX ORDER — LISINOPRIL AND HYDROCHLOROTHIAZIDE 20; 25 MG/1; MG/1
1 TABLET ORAL DAILY
Qty: 30 TABLET | Refills: 11 | Status: SHIPPED | OUTPATIENT
Start: 2023-05-17 | End: 2023-07-03 | Stop reason: SDUPTHER

## 2023-07-03 DIAGNOSIS — I10 HYPERTENSION, UNSPECIFIED TYPE: ICD-10-CM

## 2023-07-03 RX ORDER — LISINOPRIL AND HYDROCHLOROTHIAZIDE 20; 25 MG/1; MG/1
1 TABLET ORAL DAILY
Qty: 30 TABLET | Refills: 11 | Status: SHIPPED | OUTPATIENT
Start: 2023-07-03 | End: 2023-09-14 | Stop reason: SDUPTHER

## 2023-09-12 ENCOUNTER — OFFICE VISIT (OUTPATIENT)
Dept: FAMILY MEDICINE | Facility: CLINIC | Age: 38
End: 2023-09-12
Payer: MEDICAID

## 2023-09-12 VITALS
OXYGEN SATURATION: 100 % | WEIGHT: 315 LBS | BODY MASS INDEX: 44.1 KG/M2 | RESPIRATION RATE: 20 BRPM | HEART RATE: 102 BPM | SYSTOLIC BLOOD PRESSURE: 129 MMHG | HEIGHT: 71 IN | DIASTOLIC BLOOD PRESSURE: 81 MMHG

## 2023-09-12 DIAGNOSIS — E29.1 MALE HYPOGONADISM: ICD-10-CM

## 2023-09-12 DIAGNOSIS — I10 HYPERTENSION, UNSPECIFIED TYPE: ICD-10-CM

## 2023-09-12 DIAGNOSIS — F32.A DEPRESSION, UNSPECIFIED DEPRESSION TYPE: ICD-10-CM

## 2023-09-12 DIAGNOSIS — E66.01 MORBID (SEVERE) OBESITY DUE TO EXCESS CALORIES: ICD-10-CM

## 2023-09-12 DIAGNOSIS — R68.82 DECREASED LIBIDO: ICD-10-CM

## 2023-09-12 PROCEDURE — 99213 OFFICE O/P EST LOW 20 MIN: CPT | Mod: ,,, | Performed by: NURSE PRACTITIONER

## 2023-09-12 PROCEDURE — 4010F ACE/ARB THERAPY RXD/TAKEN: CPT | Mod: CPTII,,, | Performed by: NURSE PRACTITIONER

## 2023-09-12 PROCEDURE — 3008F BODY MASS INDEX DOCD: CPT | Mod: CPTII,,, | Performed by: NURSE PRACTITIONER

## 2023-09-12 PROCEDURE — 3074F SYST BP LT 130 MM HG: CPT | Mod: CPTII,,, | Performed by: NURSE PRACTITIONER

## 2023-09-12 PROCEDURE — 3074F PR MOST RECENT SYSTOLIC BLOOD PRESSURE < 130 MM HG: ICD-10-PCS | Mod: CPTII,,, | Performed by: NURSE PRACTITIONER

## 2023-09-12 PROCEDURE — 3079F PR MOST RECENT DIASTOLIC BLOOD PRESSURE 80-89 MM HG: ICD-10-PCS | Mod: CPTII,,, | Performed by: NURSE PRACTITIONER

## 2023-09-12 PROCEDURE — 1159F MED LIST DOCD IN RCRD: CPT | Mod: CPTII,,, | Performed by: NURSE PRACTITIONER

## 2023-09-12 PROCEDURE — 1159F PR MEDICATION LIST DOCUMENTED IN MEDICAL RECORD: ICD-10-PCS | Mod: CPTII,,, | Performed by: NURSE PRACTITIONER

## 2023-09-12 PROCEDURE — 3008F PR BODY MASS INDEX (BMI) DOCUMENTED: ICD-10-PCS | Mod: CPTII,,, | Performed by: NURSE PRACTITIONER

## 2023-09-12 PROCEDURE — 99213 PR OFFICE/OUTPT VISIT, EST, LEVL III, 20-29 MIN: ICD-10-PCS | Mod: ,,, | Performed by: NURSE PRACTITIONER

## 2023-09-12 PROCEDURE — 4010F PR ACE/ARB THEARPY RXD/TAKEN: ICD-10-PCS | Mod: CPTII,,, | Performed by: NURSE PRACTITIONER

## 2023-09-12 PROCEDURE — 3079F DIAST BP 80-89 MM HG: CPT | Mod: CPTII,,, | Performed by: NURSE PRACTITIONER

## 2023-09-12 NOTE — PROGRESS NOTES
SUBJECTIVE:     History of Present Illness    PCP Nahomy belcher  Chief Complaint: Follow-up    HPI:  Patient is a 38 y.o. year old pleasant male who presents to clinic for testosterone concerns.  Patient states he is always had obesity low testosterone in hormonal issues.  States he believes he was only a pituitary tumor.  Patient states that he works about unable to follow-up appointments.  History of male hypogonadism  Today he is complaining of erectile dysfunction and enlarged breast  Review of Systems:    Review of Systems    12 point review of systems conducted, negative except as stated in the history of present illness. See HPI for details.     Previous History      Review of patient's allergies indicates:  No Known Allergies    Past Medical History:   Diagnosis Date    Anemia, unspecified     Bilateral lower extremity edema     Body mass index (BMI) 50.0-59.9, adult     Decreased libido     Essential (primary) hypertension     Gout, unspecified     Male hypogonadism     Morbid (severe) obesity due to excess calories     Personal history of nicotine dependence      Current Outpatient Medications   Medication Instructions    EScitalopram oxalate (LEXAPRO) 20 MG tablet TAKE ONE TABLET BY MOUTH ONCE DAILY    ibuprofen (ADVIL,MOTRIN) 800 mg, Oral, Every 8 hours PRN    lisinopriL-hydrochlorothiazide (PRINZIDE,ZESTORETIC) 20-25 mg Tab 1 tablet, Oral, Daily    traZODone (DESYREL) 150 MG tablet TAKE ONE TABLET BY MOUTH NIGHTLY     Past Surgical History:   Procedure Laterality Date    COSMETIC SURGERY  2000    Mole removal to back     Family History   Problem Relation Age of Onset    Depression Mother     Anxiety disorder Mother     Glaucoma Father     Hypertension Father     Depression Father        Social History     Tobacco Use    Smoking status: Never    Smokeless tobacco: Former     Types: Snuff     Quit date: 2018   Substance Use Topics    Alcohol use: Yes     Alcohol/week: 6.0 standard drinks of alcohol  "    Types: 6 Cans of beer per week     Comment: 6pk day    Drug use: Never        Health Maintenance      Health Maintenance   Topic Date Due    Hepatitis C Screening  Never done    TETANUS VACCINE  10/20/2014    Lipid Panel  10/31/2027       OBJECTIVE:     Physical Exam      Vital Signs Reviewed   Visit Vitals  /81   Pulse 102   Resp 20   Ht 5' 11" (1.803 m)   Wt (!) 175.5 kg (387 lb)   SpO2 100%   BMI 53.98 kg/m²       Physical Exam    Physical Exam:  General: Alert, Obese , well nourished, no acute distress, non-toxic appearing.   Eyes: Anicteric sclera, without conjunctival injection, normal lids, no purulent drainage, EOMs grossly intact.   Ears: No tragal tenderness. Tympanic membranes intact, pearly grey, without effusion or erythema and with a positive light reflex.   Mouth: Posterior pharynx without erythema. No exudate, ulcerations, or lesion. No tonsillar swelling.   Neck: Supple, full ROM, no rigidity, no cervical adenopathy.   Cardio: Normal rate and rhythm    Resp: Respirations even and unlabored, clear to auscultation bilaterally.   Abd: No ecchymosis or distension. Normal bowel sounds in all 4 quadrants. No tenderness to palpation. No rebound tenderness or guarding. No CVA tenderness.   Skin: No rashes or open lesions noted.   MSK: No swelling. No abrasions or signs of trauma. Ambulating without assistance.   Neuro: Alert,oriented No focal deficits noted. Facial expressions even.   Psych: Cooperative, Normal affect      Procedures    Procedures     Labs     Results for orders placed or performed in visit on 10/31/22   Vitamin D   Result Value Ref Range    Vit D 25 OH 29.3 (L) 30.0 - 80.0 ng/mL   Comprehensive Metabolic Panel   Result Value Ref Range    Sodium Level 138 136 - 145 mmol/L    Potassium Level 4.2 3.5 - 5.1 mmol/L    Chloride 98 98 - 107 mmol/L    Carbon Dioxide 29 22 - 29 mmol/L    Glucose Level 100 74 - 100 mg/dL    Blood Urea Nitrogen 11.3 8.9 - 20.6 mg/dL    Creatinine 0.98 0.73 " - 1.18 mg/dL    Calcium Level Total 9.1 8.4 - 10.2 mg/dL    Protein Total 7.0 6.4 - 8.3 gm/dL    Albumin Level 3.6 3.5 - 5.0 gm/dL    Globulin 3.4 2.4 - 3.5 gm/dL    Albumin/Globulin Ratio 1.1 1.1 - 2.0 ratio    Bilirubin Total 0.4 <=1.5 mg/dL    Alkaline Phosphatase 59 40 - 150 unit/L    Alanine Aminotransferase 43 0 - 55 unit/L    Aspartate Aminotransferase 24 5 - 34 unit/L    eGFR >60 mls/min/1.73/m2   Lipid Panel   Result Value Ref Range    Cholesterol Total 159 <=200 mg/dL    HDL Cholesterol 45 35 - 60 mg/dL    Triglyceride 128 34 - 140 mg/dL    Cholesterol/HDL Ratio 4 0 - 5    Very Low Density Lipoprotein 26     LDL Cholesterol 88.00 50.00 - 140.00 mg/dL   Microalbumin/Creatinine Ratio, Urine   Result Value Ref Range    Urine Microalbumin 13.7 <=30.0 ug/ml    Urine Creatinine 97.2 63.0 - 166.0 mg/dL    Microalbumin Creatinine Ratio 14.1 0.0 - 30.0 mg/gm Cr   TSH   Result Value Ref Range    Thyroid Stimulating Hormone 2.4060 0.3500 - 4.9400 uIU/mL   Hemoglobin A1C   Result Value Ref Range    Hemoglobin A1c 5.5 <=7.0 %    Estimated Average Glucose 111.2 mg/dL   CBC with Differential   Result Value Ref Range    WBC 8.6 4.5 - 11.5 x10(3)/mcL    RBC 4.23 (L) 4.70 - 6.10 x10(6)/mcL    Hgb 13.3 (L) 14.0 - 18.0 gm/dL    Hct 42.5 42.0 - 52.0 %    .5 (H) 80.0 - 94.0 fL    MCH 31.4 (H) 27.0 - 31.0 pg    MCHC 31.3 (L) 33.0 - 36.0 mg/dL    RDW 13.3 11.5 - 17.0 %    Platelet 250 130 - 400 x10(3)/mcL    MPV 10.1 7.4 - 10.4 fL    Neut % 64.9 %    Lymph % 24.0 %    Mono % 7.4 %    Eos % 2.5 %    Basophil % 0.5 %    Lymph # 2.07 0.6 - 4.6 x10(3)/mcL    Neut # 5.6 2.1 - 9.2 x10(3)/mcL    Mono # 0.64 0.1 - 1.3 x10(3)/mcL    Eos # 0.22 0 - 0.9 x10(3)/mcL    Baso # 0.04 0 - 0.2 x10(3)/mcL    IG# 0.06 (H) 0 - 0.04 x10(3)/mcL    IG% 0.7 %       Chemistry:  Lab Results   Component Value Date     10/31/2022    K 4.2 10/31/2022    CHLORIDE 98 10/31/2022    BUN 11.3 10/31/2022    CREATININE 0.98 10/31/2022    EGFRNORACEVR  >60 10/31/2022    GLUCOSE 100 10/31/2022    CALCIUM 9.1 10/31/2022    ALKPHOS 59 10/31/2022    LABPROT 7.0 10/31/2022    ALBUMIN 3.6 10/31/2022    BILIDIR 0.12 08/08/2020    IBILI 0.18 08/08/2020    AST 24 10/31/2022    ALT 43 10/31/2022    MG 1.8 08/07/2017    [id number] 29.3 (L) 10/31/2022    TSH 2.4060 10/31/2022        Lab Results   Component Value Date    HGBA1C 5.5 10/31/2022        Hematology:  Lab Results   Component Value Date    WBC 8.6 10/31/2022    HGB 13.3 (L) 10/31/2022    HCT 42.5 10/31/2022     10/31/2022       Lipid Panel:  Lab Results   Component Value Date    CHOL 159 10/31/2022    HDL 45 10/31/2022    LDL 88.00 10/31/2022    TRIG 128 10/31/2022    TOTALCHOLEST 4 10/31/2022        Urine:  Lab Results   Component Value Date    APPEARANCEUA Clear 05/22/2017    PROTEINUA Negative 05/22/2017    LEUKOCYTESUR Negative 05/22/2017    RBCUA None Seen 05/22/2017    WBCUA None Seen 05/22/2017    BACTERIA None Seen 05/22/2017    CREATRANDUR 97.2 10/31/2022         Assessment            ICD-10-CM ICD-9-CM   1. Decreased libido  R68.82 799.81   2. Male hypogonadism  E29.1 257.2   3. Morbid (severe) obesity due to excess calories  E66.01 278.01       Plan       1. Decreased libido  Patient testosterone level needs to be collected.  Was referred to Urology in the past due to works was able unable to keep appointment    2. Male hypogonadism      See above    3 ,Morbid (severe) obesity due to excess calories      Low salt/ DASH diet   Discussed lifestyle modifications.   encourage aerobic excise at least 30 mins a day   Monitor BP daily goal less than 140/90.   Denies headaches, blurred vision, or dizziness      Patient's call Urology reschedule appointment referral was please see list in six-month ago.  Patient has a follow-up in clinic with PCP in about 6 a week or sooner if needed.  Medication List with Changes/Refills   Current Medications    ESCITALOPRAM OXALATE (LEXAPRO) 20 MG TABLET    TAKE ONE  TABLET BY MOUTH ONCE DAILY    IBUPROFEN (ADVIL,MOTRIN) 800 MG TABLET    Take 1 tablet (800 mg total) by mouth every 8 (eight) hours as needed for Pain.    LISINOPRIL-HYDROCHLOROTHIAZIDE (PRINZIDE,ZESTORETIC) 20-25 MG TAB    Take 1 tablet by mouth once daily.    TRAZODONE (DESYREL) 150 MG TABLET    TAKE ONE TABLET BY MOUTH NIGHTLY       Follow up in about 8 weeks (around 11/7/2023).   Follow up in about 8 weeks (around 11/7/2023). In addition to their scheduled follow up, the patient has also been instructed to follow up on as needed basis.   Future Appointments   Date Time Provider Department Center   10/2/2023  2:30 PM [facility]   11/7/2023  3:00 PM Nahomy Lee FNP [facility]   11/27/2024  3:00 PM Nahomy Lee [facility]

## 2023-09-14 RX ORDER — LISINOPRIL AND HYDROCHLOROTHIAZIDE 20; 25 MG/1; MG/1
1 TABLET ORAL DAILY
Qty: 30 TABLET | Refills: 0 | Status: SHIPPED | OUTPATIENT
Start: 2023-09-14 | End: 2023-09-26 | Stop reason: SDUPTHER

## 2023-09-14 RX ORDER — ESCITALOPRAM OXALATE 20 MG/1
20 TABLET ORAL DAILY
Qty: 30 TABLET | Refills: 0 | Status: SHIPPED | OUTPATIENT
Start: 2023-09-14 | End: 2023-09-26 | Stop reason: SDUPTHER

## 2023-09-26 DIAGNOSIS — I10 HYPERTENSION, UNSPECIFIED TYPE: ICD-10-CM

## 2023-09-26 DIAGNOSIS — F32.A DEPRESSION, UNSPECIFIED DEPRESSION TYPE: ICD-10-CM

## 2023-09-26 RX ORDER — LISINOPRIL AND HYDROCHLOROTHIAZIDE 20; 25 MG/1; MG/1
1 TABLET ORAL DAILY
Qty: 30 TABLET | Refills: 0 | Status: SHIPPED | OUTPATIENT
Start: 2023-09-26 | End: 2023-11-05 | Stop reason: SDUPTHER

## 2023-09-26 RX ORDER — ESCITALOPRAM OXALATE 20 MG/1
20 TABLET ORAL DAILY
Qty: 30 TABLET | Refills: 6 | Status: SHIPPED | OUTPATIENT
Start: 2023-09-26

## 2023-09-26 NOTE — TELEPHONE ENCOUNTER
----- Message from Wilber Gary sent at 9/26/2023  9:39 AM CDT -----  Regarding: refill  .Type:  RX Refill Request    Who Called: hilary   Refill or New Rx:refill  RX Name and Strength:lisinopriL-hydrochlorothiazide (PRINZIDE,ZESTORETIC) 20-25 mg Tab  How is the patient currently taking it? (ex. 1XDay): 1xday  Is this a 30 day or 90 day RX:90  Preferred Pharmacy with phone number:  Refill or New Rx:refill  RX Name and Strength:EScitalopram oxalate (LEXAPRO) 20 MG tablet  How is the patient currently taking it? (ex. 1XDay):1xday  Is this a 30 day or 90 day RX:90  [facility]  Local or Mail Order:local  Ordering Provider:  Would the patient rather a call back or a response via [facility]? nena  Best Call Back Number:[phone]  Additional Information: pt states he wants to change his pharmacy to [facility] in [place]

## 2023-10-02 ENCOUNTER — OFFICE VISIT (OUTPATIENT)
Dept: UROLOGY | Facility: CLINIC | Age: 38
End: 2023-10-02
Payer: MEDICAID

## 2023-10-02 VITALS
WEIGHT: 315 LBS | HEART RATE: 76 BPM | BODY MASS INDEX: 44.1 KG/M2 | SYSTOLIC BLOOD PRESSURE: 113 MMHG | DIASTOLIC BLOOD PRESSURE: 66 MMHG | RESPIRATION RATE: 16 BRPM | TEMPERATURE: 98 F | HEIGHT: 71 IN

## 2023-10-02 DIAGNOSIS — E29.1 HYPOGONADISM IN MALE: ICD-10-CM

## 2023-10-02 LAB
BILIRUB SERPL-MCNC: NORMAL MG/DL
BLOOD URINE, POC: NORMAL
COLOR, POC UA: YELLOW
GLUCOSE UR QL STRIP: NORMAL
KETONES UR QL STRIP: NORMAL
LEUKOCYTE ESTERASE URINE, POC: NORMAL
NITRITE, POC UA: NORMAL
PH, POC UA: 6
PROTEIN, POC: NORMAL
SPECIFIC GRAVITY, POC UA: >=1.03
UROBILINOGEN, POC UA: 0.2

## 2023-10-02 PROCEDURE — 99214 OFFICE O/P EST MOD 30 MIN: CPT | Mod: PBBFAC | Performed by: UROLOGY

## 2023-10-02 PROCEDURE — 1159F PR MEDICATION LIST DOCUMENTED IN MEDICAL RECORD: ICD-10-PCS | Mod: CPTII,,, | Performed by: UROLOGY

## 2023-10-02 PROCEDURE — 3074F PR MOST RECENT SYSTOLIC BLOOD PRESSURE < 130 MM HG: ICD-10-PCS | Mod: CPTII,,, | Performed by: UROLOGY

## 2023-10-02 PROCEDURE — 3074F SYST BP LT 130 MM HG: CPT | Mod: CPTII,,, | Performed by: UROLOGY

## 2023-10-02 PROCEDURE — 3078F PR MOST RECENT DIASTOLIC BLOOD PRESSURE < 80 MM HG: ICD-10-PCS | Mod: CPTII,,, | Performed by: UROLOGY

## 2023-10-02 PROCEDURE — 4010F ACE/ARB THERAPY RXD/TAKEN: CPT | Mod: CPTII,,, | Performed by: UROLOGY

## 2023-10-02 PROCEDURE — 1160F PR REVIEW ALL MEDS BY PRESCRIBER/CLIN PHARMACIST DOCUMENTED: ICD-10-PCS | Mod: CPTII,,, | Performed by: UROLOGY

## 2023-10-02 PROCEDURE — 4010F PR ACE/ARB THEARPY RXD/TAKEN: ICD-10-PCS | Mod: CPTII,,, | Performed by: UROLOGY

## 2023-10-02 PROCEDURE — 99204 PR OFFICE/OUTPT VISIT, NEW, LEVL IV, 45-59 MIN: ICD-10-PCS | Mod: S$PBB,,, | Performed by: UROLOGY

## 2023-10-02 PROCEDURE — 1159F MED LIST DOCD IN RCRD: CPT | Mod: CPTII,,, | Performed by: UROLOGY

## 2023-10-02 PROCEDURE — 99204 OFFICE O/P NEW MOD 45 MIN: CPT | Mod: S$PBB,,, | Performed by: UROLOGY

## 2023-10-02 PROCEDURE — 3008F BODY MASS INDEX DOCD: CPT | Mod: CPTII,,, | Performed by: UROLOGY

## 2023-10-02 PROCEDURE — 3008F PR BODY MASS INDEX (BMI) DOCUMENTED: ICD-10-PCS | Mod: CPTII,,, | Performed by: UROLOGY

## 2023-10-02 PROCEDURE — 3078F DIAST BP <80 MM HG: CPT | Mod: CPTII,,, | Performed by: UROLOGY

## 2023-10-02 PROCEDURE — 81001 URINALYSIS AUTO W/SCOPE: CPT | Mod: PBBFAC | Performed by: UROLOGY

## 2023-10-02 PROCEDURE — 1160F RVW MEDS BY RX/DR IN RCRD: CPT | Mod: CPTII,,, | Performed by: UROLOGY

## 2023-10-02 NOTE — PROGRESS NOTES
As per Angela De Los Santos MD  RTC for lab review first available appt w/ labs  Will obtain records from [facility] and MRI from [facility].

## 2023-10-02 NOTE — PROGRESS NOTES
CC:  Low testosterone    HPI:  Homero Crocker is a 38 y.o. male being seen in consultation for low testosterone.  He complains of decreased libido, fatigue, weight gain, and gynecomastia.  He says he has had labs in the past which showed low testosterone.  The doctor he saw before told him that he may have a pituitary tumor and wanted to do surgery.  He didn't have insurance at the time so he stopped going.  It sounds like he did have an MRI.  I do not have any of those records today.  He would now like to have something done and requests testosterone replacement.      Urinalysis:  Results for orders placed or performed in visit on 10/02/23   POCT URINE DIPSTICK WITH MICROSCOPE, AUTOMATED   Result Value Ref Range    Color, UA Yellow     Spec Grav UA >=1.030     pH, UA 6.0     WBC, UA neg     Nitrite, UA neg     Protein, POC neg     Glucose, UA neg     Ketones, UA neg     Urobilinogen, UA 0.2     Bilirubin, POC neg     Blood, UA neg      Microscopic:    Negative      Data Review:  Note from referring provider, Nahomy Lee FNP dated 12 September 2023.  Labs.     ROS:  All systems reviewed and are negative except as documented in HPI and/or Assessment and Plan.     Patient Active Problem List:     Patient Active Problem List   Diagnosis    Vitamin D deficiency    Sleep apnea syndrome    Decreased libido    Male hypogonadism    Morbid (severe) obesity due to excess calories        Past Medical History:  Past Medical History:   Diagnosis Date    Anemia, unspecified     Bilateral lower extremity edema     Body mass index (BMI) 50.0-59.9, adult     Decreased libido     Essential (primary) hypertension     Gout, unspecified     Male hypogonadism     Morbid (severe) obesity due to excess calories     Personal history of nicotine dependence         Past Surgical History:  Past Surgical History:   Procedure Laterality Date    COSMETIC SURGERY  2000    Mole removal to back        Family History:  Family History    Problem Relation Age of Onset    Depression Mother     Anxiety disorder Mother     Glaucoma Father     Hypertension Father     Depression Father         Social History:  Social History     Socioeconomic History    Marital status:    Tobacco Use    Smoking status: Never    Smokeless tobacco: Former     Types: Snuff     Quit date: 2018   Substance and Sexual Activity    Alcohol use: Yes     Alcohol/week: 6.0 standard drinks of alcohol     Types: 6 Cans of beer per week     Comment: 6pk day    Drug use: Never    Sexual activity: Yes     Partners: Female     Birth control/protection: None        Allergies:  Review of patient's allergies indicates:  No Known Allergies     Objective:  Vitals:    10/02/23 1432   BP: 113/66   Pulse: 76   Resp: 16   Temp: 98.3 °F (36.8 °C)     General:  Well developed, well nourished adult male in no acute distress  Abdomen: Soft, nontender, no masses  Extremities:  No clubbing, cyanosis, or edema  Neurologic:  Grossly intact  Musculoskeletal:  Normal tone    Assessment:  1. Hypogonadism in male  - Ambulatory referral/consult to Urology  - POCT URINE DIPSTICK WITH MICROSCOPE, AUTOMATED  - Testosterone; Future  - PSA, Total (Diagnostic); Future  - Estradiol; Future  - Prolactin; Future  - Follicle Stimulating Hormone; Future  - Luteinizing Hormone; Future     Plan:  With the question of a pituitary tumor and going to get an LH, FSH, prolactin, estradiol, PSA, and testosterone.  We are requesting records from [facility] since he thinks he saw a urologist here in town and also the MRI from [facility].  We will have him return after all of those results are available and go from there.    Follow-up:  As above.

## 2023-10-03 ENCOUNTER — LAB VISIT (OUTPATIENT)
Dept: LAB | Facility: HOSPITAL | Age: 38
End: 2023-10-03
Attending: UROLOGY
Payer: MEDICAID

## 2023-10-03 DIAGNOSIS — E29.1 HYPOGONADISM IN MALE: ICD-10-CM

## 2023-10-03 LAB
ESTRADIOL SERPL HS-MCNC: 54 PG/ML
FSH SERPL-ACNC: 1.9 MIU/ML
LH SERPL-ACNC: 1.19 MIU/ML
PROLACTIN LEVEL (OHS): 11.62 NG/ML (ref 3.46–19.4)
PSA SERPL-MCNC: 1 NG/ML
TESTOST SERPL-MCNC: 106.62 NG/DL (ref 240.24–870.68)

## 2023-10-03 PROCEDURE — 83002 ASSAY OF GONADOTROPIN (LH): CPT

## 2023-10-03 PROCEDURE — 84403 ASSAY OF TOTAL TESTOSTERONE: CPT

## 2023-10-03 PROCEDURE — 84153 ASSAY OF PSA TOTAL: CPT

## 2023-10-03 PROCEDURE — 36415 COLL VENOUS BLD VENIPUNCTURE: CPT

## 2023-10-03 PROCEDURE — 84146 ASSAY OF PROLACTIN: CPT

## 2023-10-03 PROCEDURE — 83001 ASSAY OF GONADOTROPIN (FSH): CPT

## 2023-10-03 PROCEDURE — 82670 ASSAY OF TOTAL ESTRADIOL: CPT

## 2023-11-01 ENCOUNTER — OFFICE VISIT (OUTPATIENT)
Dept: UROLOGY | Facility: CLINIC | Age: 38
End: 2023-11-01
Payer: MEDICAID

## 2023-11-01 VITALS
HEART RATE: 94 BPM | RESPIRATION RATE: 18 BRPM | HEIGHT: 71 IN | TEMPERATURE: 98 F | OXYGEN SATURATION: 96 % | WEIGHT: 315 LBS | BODY MASS INDEX: 44.1 KG/M2 | SYSTOLIC BLOOD PRESSURE: 110 MMHG | DIASTOLIC BLOOD PRESSURE: 78 MMHG

## 2023-11-01 DIAGNOSIS — R79.89 HIGH SERUM ESTRADIOL: ICD-10-CM

## 2023-11-01 DIAGNOSIS — N52.8 OTHER MALE ERECTILE DYSFUNCTION: ICD-10-CM

## 2023-11-01 DIAGNOSIS — Q89.2 PITUITARY HYPOPLASIA: ICD-10-CM

## 2023-11-01 DIAGNOSIS — E29.1 HYPOGONADISM IN MALE: Primary | ICD-10-CM

## 2023-11-01 PROCEDURE — 4010F ACE/ARB THERAPY RXD/TAKEN: CPT | Mod: CPTII,,, | Performed by: UROLOGY

## 2023-11-01 PROCEDURE — 99214 OFFICE O/P EST MOD 30 MIN: CPT | Mod: PBBFAC | Performed by: UROLOGY

## 2023-11-01 PROCEDURE — 1159F MED LIST DOCD IN RCRD: CPT | Mod: CPTII,,, | Performed by: UROLOGY

## 2023-11-01 PROCEDURE — 99214 OFFICE O/P EST MOD 30 MIN: CPT | Mod: S$PBB,,, | Performed by: UROLOGY

## 2023-11-01 PROCEDURE — 96372 THER/PROPH/DIAG INJ SC/IM: CPT | Mod: PBBFAC

## 2023-11-01 PROCEDURE — 4010F PR ACE/ARB THEARPY RXD/TAKEN: ICD-10-PCS | Mod: CPTII,,, | Performed by: UROLOGY

## 2023-11-01 PROCEDURE — 99214 PR OFFICE/OUTPT VISIT, EST, LEVL IV, 30-39 MIN: ICD-10-PCS | Mod: S$PBB,,, | Performed by: UROLOGY

## 2023-11-01 PROCEDURE — 3074F SYST BP LT 130 MM HG: CPT | Mod: CPTII,,, | Performed by: UROLOGY

## 2023-11-01 PROCEDURE — 1159F PR MEDICATION LIST DOCUMENTED IN MEDICAL RECORD: ICD-10-PCS | Mod: CPTII,,, | Performed by: UROLOGY

## 2023-11-01 PROCEDURE — 3078F DIAST BP <80 MM HG: CPT | Mod: CPTII,,, | Performed by: UROLOGY

## 2023-11-01 PROCEDURE — 1160F PR REVIEW ALL MEDS BY PRESCRIBER/CLIN PHARMACIST DOCUMENTED: ICD-10-PCS | Mod: CPTII,,, | Performed by: UROLOGY

## 2023-11-01 PROCEDURE — 1160F RVW MEDS BY RX/DR IN RCRD: CPT | Mod: CPTII,,, | Performed by: UROLOGY

## 2023-11-01 PROCEDURE — 3078F PR MOST RECENT DIASTOLIC BLOOD PRESSURE < 80 MM HG: ICD-10-PCS | Mod: CPTII,,, | Performed by: UROLOGY

## 2023-11-01 PROCEDURE — 3008F BODY MASS INDEX DOCD: CPT | Mod: CPTII,,, | Performed by: UROLOGY

## 2023-11-01 PROCEDURE — 81001 URINALYSIS AUTO W/SCOPE: CPT | Mod: PBBFAC | Performed by: UROLOGY

## 2023-11-01 PROCEDURE — 3074F PR MOST RECENT SYSTOLIC BLOOD PRESSURE < 130 MM HG: ICD-10-PCS | Mod: CPTII,,, | Performed by: UROLOGY

## 2023-11-01 PROCEDURE — 3008F PR BODY MASS INDEX (BMI) DOCUMENTED: ICD-10-PCS | Mod: CPTII,,, | Performed by: UROLOGY

## 2023-11-01 RX ORDER — TESTOSTERONE CYPIONATE 200 MG/ML
200 INJECTION, SOLUTION INTRAMUSCULAR
Qty: 6 ML | Refills: 0 | Status: SHIPPED | OUTPATIENT
Start: 2023-11-01 | End: 2024-02-07 | Stop reason: SDUPTHER

## 2023-11-01 RX ORDER — ANASTROZOLE 1 MG/1
1 TABLET ORAL DAILY
Qty: 90 TABLET | Refills: 1 | Status: SHIPPED | OUTPATIENT
Start: 2023-11-01 | End: 2024-02-07 | Stop reason: SDUPTHER

## 2023-11-01 RX ORDER — TESTOSTERONE CYPIONATE 200 MG/ML
200 INJECTION, SOLUTION INTRAMUSCULAR
Status: COMPLETED | OUTPATIENT
Start: 2023-11-01 | End: 2023-11-01

## 2023-11-01 RX ADMIN — TESTOSTERONE CYPIONATE 200 MG: 200 INJECTION, SOLUTION INTRAMUSCULAR at 02:11

## 2023-11-01 NOTE — PROGRESS NOTES
CC:  Hypogonadism    HPI:  Homero Crocker is a 38 y.o. male seen for follow-up of hypogonadism.  Returns for follow-up of labs done for hypogonadism.  He has felt that his testosterone has been low for a long time and really feels like this may have been even since he was a teenager.  He has not had any treatment for this other than things that he could get over-the-counter.  He did take medication at one point that seemed to help with his symptoms but he can no longer get that medication.  He has problems with fatigue, weight gain, gynecomastia, and erectile dysfunction.  He did have an MRI and mentioned something in the last visit about a pituitary tumor.  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    Urinalysis:  Results for orders placed or performed in visit on 11/01/23   POCT URINE DIPSTICK WITH MICROSCOPE, AUTOMATED   Result Value Ref Range    Color, UA Yellow     Spec Grav UA >=1.030     pH, UA 6.0     WBC, UA neg     Nitrite, UA neg     Protein, POC neg     Glucose, UA neg     Ketones, UA neg     Urobilinogen, UA 0.2     Bilirubin, POC neg     Blood, UA neg      Microscopic:  Negative      Lab Results:     Latest Reference Range & Units 10/03/23 06:38   Testosterone Total 240.24 - 870.68 ng/dL 106.62 (L)     Recent Labs     10/03/23  0638   PSA 1.00        Recent Labs     10/03/23  0638   ESTRADIOL 54     Recent Labs     10/03/23  0638   FSH 1.90      Recent Labs     10/03/23  0638   LH 1.19      Recent Labs     10/03/23  0638   PROLACTIN 11.62       Imaging:  MRI - 5 June 2020:  Pituitary gland hypoplasia       Data Review:      ROS:  All systems reviewed and are negative except as documented in HPI and/or Assessment and Plan.     Patient Active Problem List:     Patient Active Problem List   Diagnosis    Vitamin D deficiency    Sleep apnea syndrome    Decreased libido    Male hypogonadism    Morbid (severe) obesity due to excess calories        Past Medical  History:  Past Medical History:   Diagnosis Date    Anemia, unspecified     Bilateral lower extremity edema     Body mass index (BMI) 50.0-59.9, adult     Decreased libido     Essential (primary) hypertension     Gout, unspecified     Male hypogonadism     Morbid (severe) obesity due to excess calories     Personal history of nicotine dependence         Past Surgical History:  Past Surgical History:   Procedure Laterality Date    COSMETIC SURGERY  2000    Mole removal to back        Family History:  Family History   Problem Relation Age of Onset    Depression Mother     Anxiety disorder Mother     Glaucoma Father     Hypertension Father     Depression Father         Social History:  Social History     Socioeconomic History    Marital status:    Tobacco Use    Smoking status: Never    Smokeless tobacco: Former     Types: Snuff     Quit date: 2018   Substance and Sexual Activity    Alcohol use: Yes     Alcohol/week: 6.0 standard drinks of alcohol     Types: 6 Cans of beer per week     Comment: 6pk day    Drug use: Never    Sexual activity: Yes     Partners: Female     Birth control/protection: None        Allergies:  Review of patient's allergies indicates:  No Known Allergies     Objective:  Vitals:    11/01/23 1304   BP: 110/78   Pulse: 94   Resp: 18   Temp: 98.3 °F (36.8 °C)     General:  Well developed, well nourished adult male in no acute distress  Abdomen: Soft, nontender, no masses  Extremities:  No clubbing, cyanosis, or edema  Neurologic:  Grossly intact  Musculoskeletal:  Normal tone  Penis:  Circumcised, no lesions  Scrotum:  No hydrocele  Testicles:  Nontender, no masses  Epididymis:  Normal without masses  Prostate exam:  Nontender, symmetrical, no nodules  Rectal:  Normal rectal tone    Last FELICIANO:      Assessment:  1. Hypogonadism in male  - POCT URINE DIPSTICK WITH MICROSCOPE, AUTOMATED  - testosterone cypionate (DEPOTESTOTERONE CYPIONATE) 200 mg/mL injection; Inject 1 mL (200 mg total) into the  muscle every 14 (fourteen) days.  Dispense: 6 mL; Refill: 0  - testosterone cypionate injection 200 mg  - Testosterone; Future  - Estradiol; Future  - CBC Auto Differential; Future    2. High serum estradiol  - anastrozole (ARIMIDEX) 1 mg Tab; Take 1 tablet (1 mg total) by mouth once daily.  Dispense: 90 tablet; Refill: 1    3. Other male erectile dysfunction    4. Pituitary hypoplasia     Plan:  I will begin testosterone replacement with 200 mg every two weeks.  He was given the first injection here today.  We will recheck labs in three months.  I have started him on anastrozole.  If the erectile dysfunction does not improve on the above treatment we will discuss other treatment options.  Observation.    Follow-up:  Three months with labs.

## 2023-11-01 NOTE — PROGRESS NOTES
Pt seen by Dr. De Los Santos; Testosterone injection given to LGM (Lot #: [id number]; Exp: 12/2025) w/no complications; Pt instructed to return to clinic in 3 months w/labs; Discharge paperwork given w/pt verbalizing understanding

## 2023-11-05 DIAGNOSIS — I10 HYPERTENSION, UNSPECIFIED TYPE: ICD-10-CM

## 2023-11-06 RX ORDER — LISINOPRIL AND HYDROCHLOROTHIAZIDE 20; 25 MG/1; MG/1
1 TABLET ORAL DAILY
Qty: 30 TABLET | Refills: 0 | Status: SHIPPED | OUTPATIENT
Start: 2023-11-06 | End: 2023-11-27

## 2023-11-25 DIAGNOSIS — I10 HYPERTENSION, UNSPECIFIED TYPE: ICD-10-CM

## 2023-11-27 RX ORDER — LISINOPRIL AND HYDROCHLOROTHIAZIDE 20; 25 MG/1; MG/1
1 TABLET ORAL DAILY
Qty: 30 TABLET | Refills: 0 | Status: SHIPPED | OUTPATIENT
Start: 2023-11-27 | End: 2024-01-13 | Stop reason: SDUPTHER

## 2023-12-23 ENCOUNTER — OFFICE VISIT (OUTPATIENT)
Dept: URGENT CARE | Facility: CLINIC | Age: 38
End: 2023-12-23
Payer: MEDICAID

## 2023-12-23 VITALS
HEIGHT: 71 IN | WEIGHT: 315 LBS | HEART RATE: 101 BPM | BODY MASS INDEX: 44.1 KG/M2 | OXYGEN SATURATION: 95 % | SYSTOLIC BLOOD PRESSURE: 121 MMHG | DIASTOLIC BLOOD PRESSURE: 73 MMHG | TEMPERATURE: 98 F | RESPIRATION RATE: 19 BRPM

## 2023-12-23 DIAGNOSIS — J02.9 ACUTE VIRAL PHARYNGITIS: ICD-10-CM

## 2023-12-23 DIAGNOSIS — R07.0 THROAT PAIN IN ADULT: Primary | ICD-10-CM

## 2023-12-23 DIAGNOSIS — R68.89 FLU-LIKE SYMPTOMS: ICD-10-CM

## 2023-12-23 LAB
CTP QC/QA: YES
MOLECULAR STREP A: NEGATIVE
POC MOLECULAR INFLUENZA A AGN: NEGATIVE
POC MOLECULAR INFLUENZA B AGN: NEGATIVE
SARS-COV-2 RDRP RESP QL NAA+PROBE: NEGATIVE

## 2023-12-23 PROCEDURE — 87635 SARS-COV-2 COVID-19 AMP PRB: CPT | Mod: PBBFAC

## 2023-12-23 PROCEDURE — 99214 PR OFFICE/OUTPT VISIT, EST, LEVL IV, 30-39 MIN: ICD-10-PCS | Mod: S$PBB,,,

## 2023-12-23 PROCEDURE — 99214 OFFICE O/P EST MOD 30 MIN: CPT | Mod: S$PBB,,,

## 2023-12-23 PROCEDURE — 87502 INFLUENZA DNA AMP PROBE: CPT | Mod: PBBFAC

## 2023-12-23 PROCEDURE — 87651 STREP A DNA AMP PROBE: CPT | Mod: PBBFAC

## 2023-12-23 PROCEDURE — 99214 OFFICE O/P EST MOD 30 MIN: CPT | Mod: PBBFAC

## 2023-12-23 RX ORDER — IBUPROFEN 800 MG/1
800 TABLET ORAL 3 TIMES DAILY
Qty: 21 TABLET | Refills: 0 | Status: SHIPPED | OUTPATIENT
Start: 2023-12-23 | End: 2023-12-30

## 2023-12-24 NOTE — PROGRESS NOTES
"Subjective:       Patient ID: Homero Crocker is a 38 y.o. male.    Vitals:  height is 5' 10.98" (1.803 m) and weight is 183.6 kg (404 lb 11.2 oz) (abnormal). His temperature is 98.3 °F (36.8 °C). His blood pressure is 121/73 and his pulse is 101. His respiration is 19 and oxygen saturation is 95%.     Chief Complaint: Sore Throat (2 days)    39 y/o white male presents to [facility] with family reprots symptoms onset on yesterday . FLU exposure at work.     Sore Throat   This is a new problem. Associated symptoms include coughing and headaches. Pertinent negatives include no ear pain or shortness of breath.       Constitution: Negative for chills and fever.   HENT:  Positive for sore throat. Negative for ear pain.    Respiratory:  Positive for cough. Negative for chest tightness, sputum production, shortness of breath, wheezing and asthma.    Allergic/Immunologic: Negative for asthma.   Neurological:  Positive for headaches.       Objective:      Physical Exam   Constitutional: He is oriented to person, place, and time. He is cooperative. He is easily aroused. He does not appear ill. No distress. awake  HENT:   Head: Normocephalic and atraumatic.   Ears:   Right Ear: Tympanic membrane normal.   Left Ear: Tympanic membrane normal.   Nose: Nose normal.   Mouth/Throat: Uvula is midline and mucous membranes are normal. Posterior oropharyngeal erythema present. Tonsils are 2+ on the right. Tonsils are 2+ on the left. No tonsillar exudate.   +post nasal drip       Comments: +post nasal drip   Eyes: Conjunctivae and lids are normal.   Neck: Neck supple.   Cardiovascular: Normal rate.   Pulmonary/Chest: Effort normal and breath sounds normal.   Abdominal: Normal appearance.      Comments: +obese abdomen    Lymphadenopathy:     He has no cervical adenopathy.   Neurological: no focal deficit. He is alert, oriented to person, place, and time and easily aroused. GCS eye subscore is 4. GCS verbal subscore is 5. GCS motor subscore is 6. "   Skin: Skin is warm, dry and intact. Capillary refill takes less than 2 seconds.   Psychiatric: His behavior is normal.   Nursing note and vitals reviewed.chaperone present           Assessment:       1. Flu-like symptoms    2. Acute viral pharyngitis          Plan:    All tests are negative today in clinic, instructed patient to increase fluid intake, may drink warm beverages with lemon and honey to help soothe throat. Tylenol/Motrin as needed for pain/fever. Follow up with ALBERTO Segura on next week if symptoms does not improve. ER precautions discussed.      Flu-like symptoms  -     POCT COVID-19 Rapid Screening  -     POCT Influenza A/B MOLECULAR  -     POCT Strep A, Molecular    Acute viral pharyngitis           Results for orders placed or performed in visit on 12/23/23   POCT COVID-19 Rapid Screening   Result Value Ref Range    POC Rapid COVID Negative Negative     Acceptable Yes    POCT Influenza A/B MOLECULAR   Result Value Ref Range    POC Molecular Influenza A Ag Negative Negative, Not Reported    POC Molecular Influenza B Ag Negative Negative, Not Reported     Acceptable Yes    POCT Strep A, Molecular   Result Value Ref Range    Molecular Strep A, POC Negative Negative     Acceptable Yes

## 2024-01-13 ENCOUNTER — OFFICE VISIT (OUTPATIENT)
Dept: URGENT CARE | Facility: CLINIC | Age: 39
End: 2024-01-13
Payer: MEDICAID

## 2024-01-13 VITALS
WEIGHT: 315 LBS | DIASTOLIC BLOOD PRESSURE: 71 MMHG | BODY MASS INDEX: 45.1 KG/M2 | SYSTOLIC BLOOD PRESSURE: 115 MMHG | OXYGEN SATURATION: 96 % | RESPIRATION RATE: 20 BRPM | HEART RATE: 96 BPM | HEIGHT: 70 IN | TEMPERATURE: 98 F

## 2024-01-13 DIAGNOSIS — I10 HYPERTENSION, UNSPECIFIED TYPE: ICD-10-CM

## 2024-01-13 PROCEDURE — 99213 OFFICE O/P EST LOW 20 MIN: CPT | Mod: S$PBB,,, | Performed by: FAMILY MEDICINE

## 2024-01-13 PROCEDURE — 99214 OFFICE O/P EST MOD 30 MIN: CPT | Mod: PBBFAC | Performed by: FAMILY MEDICINE

## 2024-01-13 RX ORDER — INDOMETHACIN 50 MG/1
CAPSULE ORAL
COMMUNITY
Start: 2023-08-13

## 2024-01-13 RX ORDER — LISINOPRIL AND HYDROCHLOROTHIAZIDE 20; 25 MG/1; MG/1
1 TABLET ORAL DAILY
Qty: 30 TABLET | Refills: 1 | Status: SHIPPED | OUTPATIENT
Start: 2024-01-13

## 2024-01-13 NOTE — PROGRESS NOTES
"Subjective:       Patient ID: Homero Crocker is a 38 y.o. male.    Vitals:  height is 5' 10" (1.778 m) and weight is 181.7 kg (400 lb 9.2 oz) (abnormal). His temperature is 98.3 °F (36.8 °C). His blood pressure is 115/71 and his pulse is 96. His respiration is 20 and oxygen saturation is 96%.     Chief Complaint: Medication Refill (Lisinopril )    Patient needing refill on blood pressure medication.  Took the last 1 today.  States his PCP office is closed and he forgot to get his refill.  Asymptomatic.        Objective:   Physical Exam   Constitutional: He appears well-developed.  Non-toxic appearance. He does not appear ill. No distress.   Cardiovascular: Regular rhythm.   Pulmonary/Chest: Effort normal and breath sounds normal.   Abdominal: He exhibits no distension. Soft. There is no abdominal tenderness.   Musculoskeletal: Normal range of motion.         General: Normal range of motion.   Skin: Skin is warm, dry and not diaphoretic.   Nursing note and vitals reviewed.        Assessment:     1. Hypertension, unspecified type          Plan:   Chart reviewed.  Agreed to refill medication.  Follow-up with PCP.    Hypertension, unspecified type  -     lisinopriL-hydrochlorothiazide (PRINZIDE,ZESTORETIC) 20-25 mg Tab; Take 1 tablet by mouth once daily.  Dispense: 30 tablet; Refill: 1        Please note: This chart was completed via voice to text dictation. It may contain typographical/word recognition errors. If there are any questions, please contact the provider for final clarification.      "

## 2024-01-21 ENCOUNTER — OFFICE VISIT (OUTPATIENT)
Dept: URGENT CARE | Facility: CLINIC | Age: 39
End: 2024-01-21
Payer: MEDICAID

## 2024-01-21 VITALS
WEIGHT: 315 LBS | HEIGHT: 71 IN | OXYGEN SATURATION: 96 % | RESPIRATION RATE: 20 BRPM | SYSTOLIC BLOOD PRESSURE: 138 MMHG | TEMPERATURE: 98 F | HEART RATE: 72 BPM | BODY MASS INDEX: 44.1 KG/M2 | DIASTOLIC BLOOD PRESSURE: 85 MMHG

## 2024-01-21 DIAGNOSIS — H10.9 BACTERIAL CONJUNCTIVITIS OF BOTH EYES: ICD-10-CM

## 2024-01-21 DIAGNOSIS — S05.01XA CORNEAL ABRASION, RIGHT, INITIAL ENCOUNTER: Primary | ICD-10-CM

## 2024-01-21 DIAGNOSIS — B96.89 BACTERIAL CONJUNCTIVITIS OF BOTH EYES: ICD-10-CM

## 2024-01-21 PROCEDURE — 99215 OFFICE O/P EST HI 40 MIN: CPT | Mod: PBBFAC | Performed by: NURSE PRACTITIONER

## 2024-01-21 PROCEDURE — 25000003 PHARM REV CODE 250: Performed by: NURSE PRACTITIONER

## 2024-01-21 PROCEDURE — 99213 OFFICE O/P EST LOW 20 MIN: CPT | Mod: S$PBB,,, | Performed by: NURSE PRACTITIONER

## 2024-01-21 RX ORDER — CIPROFLOXACIN HYDROCHLORIDE 3 MG/ML
1 SOLUTION/ DROPS OPHTHALMIC
Qty: 10 ML | Refills: 0 | Status: SHIPPED | OUTPATIENT
Start: 2024-01-21 | End: 2024-02-04

## 2024-01-21 RX ORDER — PROPARACAINE HYDROCHLORIDE 5 MG/ML
1 SOLUTION/ DROPS OPHTHALMIC
Status: COMPLETED | OUTPATIENT
Start: 2024-01-21 | End: 2024-01-21

## 2024-01-21 RX ADMIN — FLUORESCEIN SODIUM 1 EACH: 1 STRIP OPHTHALMIC at 11:01

## 2024-01-21 RX ADMIN — PROPARACAINE HYDROCHLORIDE 1 DROP: 5 SOLUTION/ DROPS OPHTHALMIC at 11:01

## 2024-01-21 NOTE — PATIENT INSTRUCTIONS
Please follow instructions on patient education material.      Return to urgent care in 2 to 3 days if symptoms are not improving, immediately if you develop any new or worsening symptoms.   You have a scratch on your eye (pupil). You are prescribed antibiotic eye drops. You also have an infection of the eye that is contagious:  pink eye is contagious.  -Wear safety goggles when you work with machines that cut wood, metal, or other materials.   - do not touch/do not rub your eye  - if you wear contacts, throw them away, wear glasses for the next 2 days, then you can put in new lenses  - any makeup, false lashes used in the last 2-3 days, throw away and buy new  - if infection spreads from one eye to the other, okay to use same bottle of eyedrops for both eyes     Go to ER if  You notice a change in your eyesight.  ?Your eye pain gets worse.  ?You still have eye pain or are bothered by bright lights after a few days.  ?You have signs of infection - These include:  Fever of 100.4°F (38°C) or higher and chills  Swelling, redness, warmth, or pain around the eye  Drainage from the eye

## 2024-01-21 NOTE — PROGRESS NOTES
"Subjective:      Patient ID: Homero Crocker is a 38 y.o. male.    Vitals:  height is 5' 11" (1.803 m) and weight is 181.4 kg (400 lb) (abnormal). His oral temperature is 98 °F (36.7 °C). His blood pressure is 138/85 and his pulse is 72. His respiration is 20 and oxygen saturation is 96%.     Chief Complaint: Eye Problem (Right eye swelling and discomfort x 4 days. Patient states he may have gotten some dust particles in his eye, feels scratchy and vision is blurred.)    Eye Problem      As stated in CC. Pt reports symptoms began 2 days ago. 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 Pt also states he may have rubbed something in his eye. Pt does report some blurred vision after washing his eye with dove bacterial soap yesterday. Denies wearing contact lenses, headache, dizziness, fever.   ROS   Objective:     Physical Exam   Constitutional: He appears well-developed.  Non-toxic appearance. He does not appear ill. No distress. obesity  HENT:   Head: Normocephalic and atraumatic.   Ears:   Right Ear: Tympanic membrane normal.   Left Ear: Tympanic membrane normal.   Nose: Nose normal. No purulent discharge. Right sinus exhibits no maxillary sinus tenderness and no frontal sinus tenderness. Left sinus exhibits no maxillary sinus tenderness and no frontal sinus tenderness.   Mouth/Throat: Uvula is midline. Mucous membranes are moist.   Eyes: Right eye visual fields normal and left eye visual fields normal. Lids are normal. Pupils are equal, round, and reactive to light. Right eye exhibits exudate. Right eye exhibits no discharge. Left eye exhibits exudate. Left eye exhibits no discharge. Right conjunctiva is injected. Right conjunctiva has no hemorrhage. Left conjunctiva has no hemorrhage.     Extraocular movement intact      Comments: Fluorescin stain procedure explained and consent obtained.  Topical anesthetic was instilled with good anesthesia using 1 drop of tetracaine anesthetic agent. "  Fluorescein stain of the right eye was performed without uptake of dye.  There is a corneal abrasion 11:00 a.m. no foreign body noted.  Upper lid was everted no foreign body or lesions were noted.  No Seidal sign. Normal saline irrigation was performed. Yellow stretchy exudate noted to both eye, with crusting.    Neck: Neck supple. No neck rigidity present.   Cardiovascular: Regular rhythm.   Pulmonary/Chest: Effort normal and breath sounds normal. No respiratory distress. He has no wheezes. He has no rales.   Abdominal: Normal appearance.   Lymphadenopathy:     He has no cervical adenopathy.   Neurological: He is alert.   Skin: Skin is warm, dry and not diaphoretic.   Psychiatric: His behavior is normal.   Nursing note and vitals reviewed.      Assessment:     1. Corneal abrasion, right, initial encounter    2. Bacterial conjunctivitis of both eyes        Plan:   ER precautions given and discussed  -Wear safety goggles when you work with machines that cut wood, metal, or other materials.   - do not touch/do not rub your eye  - if you wear contacts, throw them away, wear glasses for the next 2 days, then you can put in new lenses  - any makeup, false lashes used in the last 2-3 days, throw away and buy new  - if infection spreads from one eye to the other, okay to use same bottle of eyedrops for both eyes     Corneal abrasion, right, initial encounter  -     proparacaine 0.5 % ophthalmic solution 1 drop  -     fluorescein ophthalmic strip 1 each  -     ciprofloxacin HCl (CILOXAN) 0.3 % ophthalmic solution; Place 1 drop into both eyes every 2 (two) hours. for 14 days  Dispense: 10 mL; Refill: 0    Bacterial conjunctivitis of both eyes  -     fluorescein ophthalmic strip 1 each  -     ciprofloxacin HCl (CILOXAN) 0.3 % ophthalmic solution; Place 1 drop into both eyes every 2 (two) hours. for 14 days  Dispense: 10 mL; Refill: 0

## 2024-02-05 ENCOUNTER — LAB VISIT (OUTPATIENT)
Dept: LAB | Facility: HOSPITAL | Age: 39
End: 2024-02-05
Attending: UROLOGY
Payer: MEDICAID

## 2024-02-05 DIAGNOSIS — E29.1 HYPOGONADISM IN MALE: ICD-10-CM

## 2024-02-05 LAB
BASOPHILS # BLD AUTO: 0.03 X10(3)/MCL
BASOPHILS NFR BLD AUTO: 0.4 %
EOSINOPHIL # BLD AUTO: 0.25 X10(3)/MCL (ref 0–0.9)
EOSINOPHIL NFR BLD AUTO: 3.5 %
ERYTHROCYTE [DISTWIDTH] IN BLOOD BY AUTOMATED COUNT: 13.1 % (ref 11.5–17)
ESTRADIOL SERPL HS-MCNC: 24 PG/ML
HCT VFR BLD AUTO: 44.6 % (ref 42–52)
HGB BLD-MCNC: 14.7 G/DL (ref 14–18)
IMM GRANULOCYTES # BLD AUTO: 0.03 X10(3)/MCL (ref 0–0.04)
IMM GRANULOCYTES NFR BLD AUTO: 0.4 %
LYMPHOCYTES # BLD AUTO: 2.13 X10(3)/MCL (ref 0.6–4.6)
LYMPHOCYTES NFR BLD AUTO: 30 %
MCH RBC QN AUTO: 31.1 PG (ref 27–31)
MCHC RBC AUTO-ENTMCNC: 33 G/DL (ref 33–36)
MCV RBC AUTO: 94.3 FL (ref 80–94)
MONOCYTES # BLD AUTO: 0.61 X10(3)/MCL (ref 0.1–1.3)
MONOCYTES NFR BLD AUTO: 8.6 %
NEUTROPHILS # BLD AUTO: 4.05 X10(3)/MCL (ref 2.1–9.2)
NEUTROPHILS NFR BLD AUTO: 57.1 %
NRBC BLD AUTO-RTO: 0 %
PLATELET # BLD AUTO: 327 X10(3)/MCL (ref 130–400)
PMV BLD AUTO: 10.5 FL (ref 7.4–10.4)
RBC # BLD AUTO: 4.73 X10(6)/MCL (ref 4.7–6.1)
TESTOST SERPL-MCNC: 448.84 NG/DL (ref 240.24–870.68)
WBC # SPEC AUTO: 7.1 X10(3)/MCL (ref 4.5–11.5)

## 2024-02-05 PROCEDURE — 85025 COMPLETE CBC W/AUTO DIFF WBC: CPT

## 2024-02-05 PROCEDURE — 36415 COLL VENOUS BLD VENIPUNCTURE: CPT

## 2024-02-05 PROCEDURE — 82670 ASSAY OF TOTAL ESTRADIOL: CPT

## 2024-02-05 PROCEDURE — 84403 ASSAY OF TOTAL TESTOSTERONE: CPT

## 2024-02-07 ENCOUNTER — OFFICE VISIT (OUTPATIENT)
Dept: UROLOGY | Facility: CLINIC | Age: 39
End: 2024-02-07
Payer: MEDICAID

## 2024-02-07 VITALS
SYSTOLIC BLOOD PRESSURE: 122 MMHG | BODY MASS INDEX: 44.1 KG/M2 | WEIGHT: 315 LBS | DIASTOLIC BLOOD PRESSURE: 80 MMHG | HEIGHT: 71 IN | HEART RATE: 57 BPM | RESPIRATION RATE: 20 BRPM | TEMPERATURE: 98 F | OXYGEN SATURATION: 100 %

## 2024-02-07 DIAGNOSIS — F52.32 DELAYED EJACULATION: ICD-10-CM

## 2024-02-07 DIAGNOSIS — R79.89 HIGH SERUM ESTRADIOL: ICD-10-CM

## 2024-02-07 DIAGNOSIS — E29.1 HYPOGONADISM IN MALE: Primary | ICD-10-CM

## 2024-02-07 DIAGNOSIS — Q89.2 PITUITARY HYPOPLASIA: ICD-10-CM

## 2024-02-07 LAB
BILIRUB SERPL-MCNC: NEGATIVE MG/DL
BLOOD URINE, POC: NEGATIVE
COLOR, POC UA: YELLOW
GLUCOSE UR QL STRIP: NEGATIVE
KETONES UR QL STRIP: NEGATIVE
LEUKOCYTE ESTERASE URINE, POC: NEGATIVE
NITRITE, POC UA: NEGATIVE
PH, POC UA: 5.5
PROTEIN, POC: NEGATIVE
SPECIFIC GRAVITY, POC UA: 1.03
UROBILINOGEN, POC UA: 0.2

## 2024-02-07 PROCEDURE — 3008F BODY MASS INDEX DOCD: CPT | Mod: CPTII,,, | Performed by: UROLOGY

## 2024-02-07 PROCEDURE — 3074F SYST BP LT 130 MM HG: CPT | Mod: CPTII,,, | Performed by: UROLOGY

## 2024-02-07 PROCEDURE — 81001 URINALYSIS AUTO W/SCOPE: CPT | Mod: PBBFAC | Performed by: UROLOGY

## 2024-02-07 PROCEDURE — 99214 OFFICE O/P EST MOD 30 MIN: CPT | Mod: PBBFAC | Performed by: UROLOGY

## 2024-02-07 PROCEDURE — 1160F RVW MEDS BY RX/DR IN RCRD: CPT | Mod: CPTII,,, | Performed by: UROLOGY

## 2024-02-07 PROCEDURE — 1159F MED LIST DOCD IN RCRD: CPT | Mod: CPTII,,, | Performed by: UROLOGY

## 2024-02-07 PROCEDURE — 99214 OFFICE O/P EST MOD 30 MIN: CPT | Mod: S$PBB,,, | Performed by: UROLOGY

## 2024-02-07 PROCEDURE — 4010F ACE/ARB THERAPY RXD/TAKEN: CPT | Mod: CPTII,,, | Performed by: UROLOGY

## 2024-02-07 PROCEDURE — 3079F DIAST BP 80-89 MM HG: CPT | Mod: CPTII,,, | Performed by: UROLOGY

## 2024-02-07 RX ORDER — ANASTROZOLE 1 MG/1
1 TABLET ORAL DAILY
Qty: 90 TABLET | Refills: 3 | Status: SHIPPED | OUTPATIENT
Start: 2024-02-07

## 2024-02-07 RX ORDER — TESTOSTERONE CYPIONATE 200 MG/ML
200 INJECTION, SOLUTION INTRAMUSCULAR
Qty: 2 ML | Refills: 5 | Status: SHIPPED | OUTPATIENT
Start: 2024-02-07 | End: 2024-07-24

## 2024-02-07 NOTE — PROGRESS NOTES
CC:  Hypogonadism    HPI:  Homero Crocker is a 38 y.o. male seen for follow-up of hypogonadism.  He is currently on 200 mg every two weeks of testosterone cypionate.  He is also on anastrozole due to a high estradiol.  He has been doing this for the last three months and is feeling much better.  He has more energy and just overall feels better.  He complains today of difficulty achieving ejaculation.    Urinalysis:  Results for orders placed or performed in visit on 02/07/24   POCT URINE DIPSTICK WITH MICROSCOPE, AUTOMATED   Result Value Ref Range    Color, UA Yellow     Spec Grav UA 1.030     pH, UA 5.5     WBC, UA Negative     Nitrite, UA Negative     Protein, POC Negative     Glucose, UA Negative     Ketones, UA Negative     Urobilinogen, UA 0.2     Bilirubin, POC Negative     Blood, UA Negative      Microscopic Urinalysis:  WBC:   None per HPF     RBC:    None per HPF     Bacteria:    None per HPF     Squamous epithelial cells:    None per HPF      Crystals:   None    Lab Results:  PSA History:     Latest Reference Range & Units 10/03/23 06:38   Prostate Specific Antigen <=4.00 ng/mL 1.00      Latest Reference Range & Units 10/03/23 06:38 02/05/24 15:28   Testosterone Total 240.24 - 870.68 ng/dL 106.62 (L) 448.84      Latest Reference Range & Units 10/03/23 06:38 02/05/24 15:28   Estradiol pg/mL 54 24     ROS:  All systems reviewed and are negative except as documented in HPI and/or Assessment and Plan.     Patient Active Problem List:     Patient Active Problem List   Diagnosis    Vitamin D deficiency    Sleep apnea syndrome    Decreased libido    Male hypogonadism    Morbid (severe) obesity due to excess calories        Past Medical History:  Past Medical History:   Diagnosis Date    Anemia, unspecified     Bilateral lower extremity edema     Body mass index (BMI) 50.0-59.9, adult     Decreased libido     Essential (primary) hypertension     Gout, unspecified     Male hypogonadism     Morbid (severe) obesity  due to excess calories     Personal history of nicotine dependence         Past Surgical History:  Past Surgical History:   Procedure Laterality Date    COSMETIC SURGERY  2000    Mole removal to back        Family History:  Family History   Problem Relation Age of Onset    Depression Mother     Anxiety disorder Mother     Glaucoma Father     Hypertension Father     Depression Father         Social History:  Social History     Socioeconomic History    Marital status:    Tobacco Use    Smoking status: Never    Smokeless tobacco: Former     Types: Snuff     Quit date: 2018   Substance and Sexual Activity    Alcohol use: Yes     Alcohol/week: 6.0 standard drinks of alcohol     Types: 6 Cans of beer per week     Comment: 6pk day    Drug use: Never    Sexual activity: Yes     Partners: Female     Birth control/protection: None        Allergies:  Review of patient's allergies indicates:  No Known Allergies     Objective:  Vitals:    02/07/24 1352   BP: 122/80   Pulse: (!) 57   Resp: 20   Temp: 98.2 °F (36.8 °C)     General:  Well developed, well nourished adult male in no acute distress  Abdomen: Soft, nontender, no masses  Extremities:  No clubbing, cyanosis, or edema  Neurologic:  Grossly intact  Musculoskeletal:  Normal tone    Assessment:  1. Hypogonadism in male  - POCT URINE DIPSTICK WITH MICROSCOPE, AUTOMATED  - Testosterone; Future  - PSA, Total (Diagnostic); Future  - Estradiol; Future  - CBC Auto Differential; Future  - testosterone cypionate (DEPOTESTOTERONE CYPIONATE) 200 mg/mL injection; Inject 1 mL (200 mg total) into the muscle every 14 (fourteen) days.  Dispense: 2 mL; Refill: 5    2. High serum estradiol  - POCT URINE DIPSTICK WITH MICROSCOPE, AUTOMATED  - anastrozole (ARIMIDEX) 1 mg Tab; Take 1 tablet (1 mg total) by mouth once daily.  Dispense: 90 tablet; Refill: 3    3. Pituitary hypoplasia  - POCT URINE DIPSTICK WITH MICROSCOPE, AUTOMATED    4. Delayed ejaculation     Plan:  Continue  testosterone at the current dose of 200 mg every two weeks.  This was refilled to last him until his next appointment.  Continue anastrozole.  This was refilled to give him enough to last until his next appointment.  The hypogonadism is caused by the pituitary hypoplasia.  I discussed with him that delayed ejaculation is a side effect of SSRIs and he is on Lexapro.  He will discuss this with his primary care and see if he can switch to a different type of medication.    Follow-up:  Six months with labs as above.

## 2024-03-12 ENCOUNTER — ON-DEMAND VIRTUAL (OUTPATIENT)
Dept: URGENT CARE | Facility: CLINIC | Age: 39
End: 2024-03-12
Payer: MEDICAID

## 2024-03-12 DIAGNOSIS — H10.31 ACUTE BACTERIAL CONJUNCTIVITIS OF RIGHT EYE: Primary | ICD-10-CM

## 2024-03-12 PROCEDURE — 99213 OFFICE O/P EST LOW 20 MIN: CPT | Mod: 95,,, | Performed by: NURSE PRACTITIONER

## 2024-03-12 RX ORDER — TOBRAMYCIN 3 MG/ML
1 SOLUTION/ DROPS OPHTHALMIC EVERY 4 HOURS
Qty: 5 ML | Refills: 0 | Status: SHIPPED | OUTPATIENT
Start: 2024-03-12

## 2024-03-12 NOTE — PATIENT INSTRUCTIONS
Use natural tears three times daily at work to prevent future issues    Treat both eyes as directed.   Avoid contact with eyes and perform good hand hygiene.   If you were prescribed antibiotic eye drops take as directed.   Do not wear contacts for one week.   Follow up with Eye Doctor if no improvement in symptoms or for any worsening of symptoms.

## 2024-03-12 NOTE — PROGRESS NOTES
Subjective:      Patient ID: Homero Crocker is a 38 y.o. male.    Vitals:  vitals were not taken for this visit.     Chief Complaint: Conjunctivitis      Visit Type: TELE AUDIOVISUAL    Present with the patient at the time of consultation: TELEMED PRESENT WITH PATIENT: None        Past Medical History:   Diagnosis Date    Anemia, unspecified     Bilateral lower extremity edema     Body mass index (BMI) 50.0-59.9, adult     Decreased libido     Essential (primary) hypertension     Gout, unspecified     Male hypogonadism     Morbid (severe) obesity due to excess calories     Personal history of nicotine dependence      Past Surgical History:   Procedure Laterality Date    COSMETIC SURGERY  2000    Mole removal to back     Review of patient's allergies indicates:  No Known Allergies  Current Outpatient Medications on File Prior to Visit   Medication Sig Dispense Refill    anastrozole (ARIMIDEX) 1 mg Tab Take 1 tablet (1 mg total) by mouth once daily. 90 tablet 3    EScitalopram oxalate (LEXAPRO) 20 MG tablet Take 1 tablet (20 mg total) by mouth once daily. 30 tablet 6    ibuprofen (ADVIL,MOTRIN) 800 MG tablet Take 1 tablet (800 mg total) by mouth every 8 (eight) hours as needed for Pain. (Patient not taking: Reported on 10/2/2023) 30 tablet 4    indomethacin (INDOCIN) 50 MG capsule TAKE ONE CAPSULE BY MOUTH THREE TIMES DAILY AS NEEDED FOR UP TO 5 DAYS      lisinopriL-hydrochlorothiazide (PRINZIDE,ZESTORETIC) 20-25 mg Tab Take 1 tablet by mouth once daily. 30 tablet 1    testosterone cypionate (DEPOTESTOTERONE CYPIONATE) 200 mg/mL injection Inject 1 mL (200 mg total) into the muscle every 14 (fourteen) days. 2 mL 5    traZODone (DESYREL) 150 MG tablet TAKE ONE TABLET BY MOUTH NIGHTLY (Patient not taking: Reported on 9/12/2023) 30 tablet 11     No current facility-administered medications on file prior to visit.     Family History   Problem Relation Age of Onset    Depression Mother     Anxiety disorder Mother      Glaucoma Father     Hypertension Father     Depression Father        Medications Ordered                [id number] - [place], [place] - [address]   [address] [address]    Telephone: [phone]   Fax: [phone]   Hours: Not open 24 hours                         E-Prescribed (1 of 1)              tobramycin sulfate 0.3% (TOBREX) 0.3 % ophthalmic solution    Sig: Place 1 drop into the right eye every 4 (four) hours.       Start: 3/12/24     Quantity: 5 mL Refills: 0                           Ohs Peq Odvv Intake    3/12/2024  4:30 PM CDT - Filed by Patient   What is your current physical address in the event of a medical emergency? [address] [address]   Are you able to take your vital signs? No   Please attach any relevant images or files          39 yo male with c/o conjunctivitis to his right eye . He states he has been getting it frequently due to work environment. He denies change in vision. Denies blurred vision. Denies discharge. He denies nasal congestion.         Constitution: Negative. Negative for fever.   HENT:  Negative for congestion.    Cardiovascular: Negative.    Eyes:  Positive for eye redness. Negative for eye trauma, foreign body in eye, eye discharge, eye itching, eye pain, photophobia, vision loss, double vision, blurred vision and eyelid swelling.   Respiratory: Negative.     Gastrointestinal: Negative.  Negative for bowel incontinence.   Endocrine: negative.   Genitourinary: Negative.  Negative for dysuria, flank pain, bladder incontinence and pelvic pain.   Musculoskeletal: Negative.  Negative for pain, abnormal ROM of joint and back pain.   Skin: Negative.    Allergic/Immunologic: Negative.    Neurological: Negative.    Hematologic/Lymphatic: Negative.    Psychiatric/Behavioral: Negative.          Objective:   The physical exam was conducted virtually.  LOCATION OF PATIENT home  Physical Exam   Constitutional: He is oriented to  person, place, and time. He appears well-developed.   HENT:   Head: Normocephalic and atraumatic.   Ears:   Right Ear: Hearing, tympanic membrane and external ear normal.   Left Ear: Hearing, tympanic membrane and external ear normal.   Nose: Nose normal.   Mouth/Throat: Uvula is midline, oropharynx is clear and moist and mucous membranes are normal.   Eyes: EOM and lids are normal. Pupils are equal, round, and reactive to light. Right eye exhibits exudate. Right conjunctiva is injected. vision grossly intact gaze aligned appropriately   Neck: Neck supple.   Cardiovascular: Normal rate.   Pulmonary/Chest: Effort normal and breath sounds normal.   Musculoskeletal: Normal range of motion.         General: Normal range of motion.   Neurological: He is alert and oriented to person, place, and time.   Skin: Skin is warm.   Psychiatric: His behavior is normal. Thought content normal.   Nursing note and vitals reviewed.      Assessment:     1. Acute bacterial conjunctivitis of right eye        Plan:     Patient Instructions   Use natural tears three times daily at work to prevent future issues    Treat both eyes as directed.   Avoid contact with eyes and perform good hand hygiene.   If you were prescribed antibiotic eye drops take as directed.   Do not wear contacts for one week.   Follow up with Eye Doctor if no improvement in symptoms or for any worsening of symptoms.       Acute bacterial conjunctivitis of right eye  -     tobramycin sulfate 0.3% (TOBREX) 0.3 % ophthalmic solution; Place 1 drop into the right eye every 4 (four) hours.  Dispense: 5 mL; Refill: 0

## 2024-03-19 DIAGNOSIS — I10 HYPERTENSION, UNSPECIFIED TYPE: ICD-10-CM

## 2024-03-19 RX ORDER — LISINOPRIL AND HYDROCHLOROTHIAZIDE 20; 25 MG/1; MG/1
1 TABLET ORAL DAILY
Qty: 30 TABLET | Refills: 1 | Status: SHIPPED | OUTPATIENT
Start: 2024-03-19 | End: 2024-05-15

## 2024-04-10 ENCOUNTER — HOSPITAL ENCOUNTER (OUTPATIENT)
Dept: RADIOLOGY | Facility: HOSPITAL | Age: 39
Discharge: HOME | End: 2024-04-10
Attending: FAMILY MEDICINE
Payer: MEDICAID

## 2024-04-10 ENCOUNTER — OFFICE VISIT (OUTPATIENT)
Dept: URGENT CARE | Facility: CLINIC | Age: 39
End: 2024-04-10
Payer: MEDICAID

## 2024-04-10 VITALS
SYSTOLIC BLOOD PRESSURE: 119 MMHG | TEMPERATURE: 98 F | HEIGHT: 71 IN | DIASTOLIC BLOOD PRESSURE: 85 MMHG | WEIGHT: 315 LBS | BODY MASS INDEX: 44.1 KG/M2 | HEART RATE: 75 BPM | OXYGEN SATURATION: 96 % | RESPIRATION RATE: 17 BRPM

## 2024-04-10 DIAGNOSIS — M79.671 FOOT PAIN, BILATERAL: ICD-10-CM

## 2024-04-10 DIAGNOSIS — M79.672 FOOT PAIN, BILATERAL: ICD-10-CM

## 2024-04-10 DIAGNOSIS — M79.671 FOOT PAIN, BILATERAL: Primary | ICD-10-CM

## 2024-04-10 DIAGNOSIS — M79.672 FOOT PAIN, BILATERAL: Primary | ICD-10-CM

## 2024-04-10 PROCEDURE — 73630 X-RAY EXAM OF FOOT: CPT | Mod: 26,RT,, | Performed by: RADIOLOGY

## 2024-04-10 PROCEDURE — 73630 X-RAY EXAM OF FOOT: CPT | Mod: TC,LT

## 2024-04-10 PROCEDURE — 99214 OFFICE O/P EST MOD 30 MIN: CPT | Mod: PBBFAC,25 | Performed by: FAMILY MEDICINE

## 2024-04-10 PROCEDURE — 99214 OFFICE O/P EST MOD 30 MIN: CPT | Mod: S$PBB,,, | Performed by: FAMILY MEDICINE

## 2024-04-10 PROCEDURE — 73630 X-RAY EXAM OF FOOT: CPT | Mod: TC,RT

## 2024-04-10 PROCEDURE — 73630 X-RAY EXAM OF FOOT: CPT | Mod: 26,LT,, | Performed by: RADIOLOGY

## 2024-04-10 RX ORDER — DICLOFENAC SODIUM 75 MG/1
75 TABLET, DELAYED RELEASE ORAL 2 TIMES DAILY
Qty: 30 TABLET | Refills: 1 | Status: SHIPPED | OUTPATIENT
Start: 2024-04-10

## 2024-04-10 NOTE — PROGRESS NOTES
"Subjective:       Patient ID: Homero Crocker is a 38 y.o. male.    Vitals:  height is 5' 10.98" (1.803 m) and weight is 180.2 kg (397 lb 3.2 oz) (abnormal). His oral temperature is 98 °F (36.7 °C). His blood pressure is 119/85 and his pulse is 75. His respiration is 17 and oxygen saturation is 96%.     Chief Complaint: Foot Pain (Prt rpeorts hx of gout, pt reprots that this feels different. Started last week)    Patient presents to urgent care clinic with several days of bilateral foot pain.  Has had this in the past.  States has a history of gout but does not think that is what this is.  He does lots of standing at work, worse boots.  No specific precipitating event.  Has had no soft tissue swelling, no fever, no significant ankle symptoms.  Pain on the right foot is in the 2nd and 3rd metatarsal head, pain in the left foot is more general including the 5th metatarsal.  Having no redness or fever.    All other systems are negative    Chart reviewed-no history of CKD or PUD    Objective:   Physical Exam   Constitutional: He appears well-developed.  Non-toxic appearance. He does not appear ill. No distress.   Abdominal: Normal appearance.   Musculoskeletal:      Right ankle: Normal. Achilles tendon normal.      Left ankle: Normal. Achilles tendon normal.      Right foot: Normal range of motion and normal capillary refill. Tenderness present. No swelling or crepitus.      Left foot: Normal range of motion and normal capillary refill. No tenderness, bony tenderness, swelling or crepitus.        Feet:    Neurological: no focal deficit. He is alert. He displays no weakness.   Skin: Skin is not diaphoretic. Capillary refill takes less than 2 seconds.   Psychiatric: His behavior is normal. Mood normal.   Nursing note and vitals reviewed.    XR FOOT COMPLETE 3 VIEW LEFT    Result Date: 4/10/2024  EXAMINATION: Left foot three views CLINICAL HISTORY: Pain COMPARISON: None FINDINGS: No acute fractures or subluxation seen.  The " joint spaces are maintained.  Small calcaneal enthesophytes are noted.  Bipartite hallux sesamoids noted.  No focal soft tissue abnormality     No acute osseous finding. Electronically signed by: Ron Fitzpatrick MD Date:    04/10/2024 Time:    09:56    XR FOOT COMPLETE 3 VIEW RIGHT    Result Date: 4/10/2024  EXAMINATION: Right foot three views CLINICAL HISTORY: Foot pain COMPARISON: None FINDINGS: No fracture subluxation.  Calcaneal enthesophytes noted.  No erosions seen.  No osseous destruction.  No significant joint space narrowing.  Bipartite hallux sesamoids noted.     No acute osseous abnormality. Electronically signed by: Ron Fitzpatrick MD Date:    04/10/2024 Time:    09:55       Assessment:     1. Foot pain, bilateral            Plan:   Discussed foot mechanics, importance of proper footwear. Prescribed a few diclofenac with side effect precautions.  Avoid other anti-inflammatory medications as discussed.  Schedule a timely follow-up with PCP.  Return to urgent care if needed.      Foot pain, bilateral  -     XR FOOT COMPLETE 3 VIEW RIGHT; Future; Expected date: 04/10/2024  -     XR FOOT COMPLETE 3 VIEW LEFT; Future; Expected date: 04/10/2024        Please note: This chart was completed via voice to text dictation. It may contain typographical/word recognition errors. If there are any questions, please contact the provider for final clarification.

## 2024-04-10 NOTE — LETTER
April 10, 2024      [facility]  [facility] [address]  Phone: [phone]       Patient: Homero Crocker   YOB: 1985  Date of Visit: 04/10/2024    To Whom It May Concern:    Jazmyn Crocker  was at [facility] on 04/10/2024. The patient may return to work/school on APRIL 11 2024 with no  restrictions. If you have any questions or concerns, or if I can be of further assistance, please do not hesitate to contact me.    Sincerely,    TASHA NAVARRETE MD

## 2024-05-15 DIAGNOSIS — I10 HYPERTENSION, UNSPECIFIED TYPE: ICD-10-CM

## 2024-05-15 RX ORDER — LISINOPRIL AND HYDROCHLOROTHIAZIDE 20; 25 MG/1; MG/1
1 TABLET ORAL DAILY
Qty: 30 TABLET | Refills: 6 | Status: SHIPPED | OUTPATIENT
Start: 2024-05-15

## 2024-06-06 ENCOUNTER — OFFICE VISIT (OUTPATIENT)
Dept: URGENT CARE | Facility: CLINIC | Age: 39
End: 2024-06-06

## 2024-06-06 VITALS
WEIGHT: 315 LBS | HEART RATE: 94 BPM | RESPIRATION RATE: 20 BRPM | BODY MASS INDEX: 44.1 KG/M2 | OXYGEN SATURATION: 94 % | SYSTOLIC BLOOD PRESSURE: 126 MMHG | TEMPERATURE: 99 F | HEIGHT: 71 IN | DIASTOLIC BLOOD PRESSURE: 84 MMHG

## 2024-06-06 DIAGNOSIS — R19.7 DIARRHEA, UNSPECIFIED TYPE: Primary | ICD-10-CM

## 2024-06-06 PROCEDURE — 99203 OFFICE O/P NEW LOW 30 MIN: CPT | Mod: S$PBB,,, | Performed by: FAMILY MEDICINE

## 2024-06-06 PROCEDURE — 99214 OFFICE O/P EST MOD 30 MIN: CPT | Mod: PBBFAC

## 2024-06-06 NOTE — LETTER
June 6, 2024      [facility]  [facility] [address]  Phone: [phone]       Patient: Homero Crocker   YOB: 1985  Date of Visit: 06/06/2024    To Whom It May Concern:    Jazmyn Crocker  was at [facility] on 06/06/2024. The patient may return to work/school on JUNE 8 2024 with no restrictions. If you have any questions or concerns, or if I can be of further assistance, please do not hesitate to contact me.    Sincerely,    YOVANNY COLVIN MD

## 2024-06-06 NOTE — PROGRESS NOTES
"[facility] Visit Note    Subjective:       Patient ID: Homero Carrier is a 38 y.o. male.    Chief Complaint: Diarrhea (Ha X today) and Letter for School/Work (DID NOT GO TO WORK TODAY, WANTS EXCUSE)      HPI:  38 y.o. male presents to [facility] for onset of diarrhea and HA that started today. Stool watery without blood, has had 2 BM within past 1hr. Gets abd cramps just prior to having BM. HA started today, mostly to top of head, hurts more when pt gets up to walk around. 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 Usually drinks 2gal of po fluids daily consisting of water and gatorade. Denies cough, congestion, fever, sore throat. Was having mucus that is resolving. Did note change in diet yesterday - had some pork and pt does not usually eat pork. No vision changes.    Review of Systems:  Gen: denies fever and chills  Resp: denies cough, shortness of breath and wheezing  CV: denies chest pain and palpitations  GI: see above hpi    Objective:      /84   Pulse 94   Temp 98.8 °F (37.1 °C)   Resp 20   Ht 5' 11" (1.803 m)   Wt (!) 181 kg (399 lb 0.5 oz)   SpO2 (!) 94%   BMI 55.65 kg/m²     Physical Exam:  Gen: alert and oriented, NAD, obese  HEENT: no sinus tenderness.   CV: RRR, S1 and S2 present, no murmurs appreciated on exam  Resp: CTA bilaterally, breathing nonlabored on room air  Abd: Soft, non-distended, non-tender, bowel sounds normoactive  Neuro: EOMI, eyes moving in tandem. Moving all extremities symmetric    Current Outpatient Medications   Medication Instructions    anastrozole (ARIMIDEX) 1 mg, Oral, Daily    diclofenac (VOLTAREN) 75 mg, Oral, 2 times daily    EScitalopram oxalate (LEXAPRO) 20 mg, Oral, Daily    ibuprofen (ADVIL,MOTRIN) 800 mg, Oral, Every 8 hours PRN    lisinopriL-hydrochlorothiazide (PRINZIDE,ZESTORETIC) 20-25 mg Tab 1 tablet, Oral, Daily    testosterone cypionate (DEPOTESTOTERONE CYPIONATE) 200 mg, Intramuscular, Every 14 days    tobramycin " sulfate 0.3% (TOBREX) 0.3 % ophthalmic solution 1 drop, Right Eye, Every 4 hours    traZODone (DESYREL) 150 MG tablet TAKE ONE TABLET BY MOUTH NIGHTLY          Assessment/Plan:     1. Diarrhea, unspecified type             Encouraged po hydration. Likely gastroenteritis. Monitor HA. Pt with h/o sleep apnea. O2 sat 94%; pt without laborous breathing; suspect obesity hypoventilation syndrome. Return precautions discussed.     verbal instruction

## 2024-08-10 DIAGNOSIS — E29.1 HYPOGONADISM IN MALE: ICD-10-CM

## 2024-08-10 RX ORDER — TESTOSTERONE CYPIONATE 200 MG/ML
INJECTION, SOLUTION INTRAMUSCULAR
Qty: 2 ML | Refills: 0 | Status: SHIPPED | OUTPATIENT
Start: 2024-08-10

## 2024-08-12 ENCOUNTER — TELEPHONE (OUTPATIENT)
Dept: FAMILY MEDICINE | Facility: CLINIC | Age: 39
End: 2024-08-12

## 2024-08-19 ENCOUNTER — TELEPHONE (OUTPATIENT)
Dept: FAMILY MEDICINE | Facility: CLINIC | Age: 39
End: 2024-08-19

## 2024-08-19 ENCOUNTER — LAB VISIT (OUTPATIENT)
Dept: LAB | Facility: HOSPITAL | Age: 39
End: 2024-08-19
Attending: UROLOGY

## 2024-08-19 DIAGNOSIS — E29.1 HYPOGONADISM IN MALE: ICD-10-CM

## 2024-08-19 LAB
BASOPHILS # BLD AUTO: 0.06 X10(3)/MCL
BASOPHILS NFR BLD AUTO: 0.7 %
EOSINOPHIL # BLD AUTO: 0.29 X10(3)/MCL (ref 0–0.9)
EOSINOPHIL NFR BLD AUTO: 3.5 %
ERYTHROCYTE [DISTWIDTH] IN BLOOD BY AUTOMATED COUNT: 13.2 % (ref 11.5–17)
ESTRADIOL SERPL HS-MCNC: 32 PG/ML
HCT VFR BLD AUTO: 47.3 % (ref 42–52)
HGB BLD-MCNC: 15.2 G/DL (ref 14–18)
IMM GRANULOCYTES # BLD AUTO: 0.04 X10(3)/MCL (ref 0–0.04)
IMM GRANULOCYTES NFR BLD AUTO: 0.5 %
LYMPHOCYTES # BLD AUTO: 2.23 X10(3)/MCL (ref 0.6–4.6)
LYMPHOCYTES NFR BLD AUTO: 27.2 %
MCH RBC QN AUTO: 30.6 PG (ref 27–31)
MCHC RBC AUTO-ENTMCNC: 32.1 G/DL (ref 33–36)
MCV RBC AUTO: 95.2 FL (ref 80–94)
MONOCYTES # BLD AUTO: 0.54 X10(3)/MCL (ref 0.1–1.3)
MONOCYTES NFR BLD AUTO: 6.6 %
NEUTROPHILS # BLD AUTO: 5.05 X10(3)/MCL (ref 2.1–9.2)
NEUTROPHILS NFR BLD AUTO: 61.5 %
NRBC BLD AUTO-RTO: 0 %
PLATELET # BLD AUTO: 311 X10(3)/MCL (ref 130–400)
PMV BLD AUTO: 10.8 FL (ref 7.4–10.4)
PSA SERPL-MCNC: 1.51 NG/ML
RBC # BLD AUTO: 4.97 X10(6)/MCL (ref 4.7–6.1)
TESTOST SERPL-MCNC: 594.93 NG/DL (ref 240.24–870.68)
WBC # BLD AUTO: 8.21 X10(3)/MCL (ref 4.5–11.5)

## 2024-08-19 PROCEDURE — 36415 COLL VENOUS BLD VENIPUNCTURE: CPT

## 2024-08-19 PROCEDURE — 84403 ASSAY OF TOTAL TESTOSTERONE: CPT

## 2024-08-19 PROCEDURE — 85025 COMPLETE CBC W/AUTO DIFF WBC: CPT

## 2024-08-19 PROCEDURE — 84153 ASSAY OF PSA TOTAL: CPT

## 2024-08-19 PROCEDURE — 82670 ASSAY OF TOTAL ESTRADIOL: CPT

## 2024-08-19 NOTE — TELEPHONE ENCOUNTER
Pt requesting an excuse. Informed pt because he was not seen in the clinic today we are unable to print an excuse. Pt stating he missed work to come to nuha appointment in the clinic but his vehicle would not start. Again informed pt he was not seen in clinic, an excuse could not be given.----- Message from Kendra Gonzalez sent at 8/19/2024  3:25 PM CDT -----  Who Called: Homero Carrier    Caller is requesting assistance/information from provider's office.  Preferred Method of Contact: Phone Call  Patient's Preferred Phone Number on File: [phone]   Best Call Back Number, if different:  Additional Information:medical advice, pt called to confirm status on work excuse, please advise, thanks

## 2024-08-31 ENCOUNTER — HOSPITAL ENCOUNTER (EMERGENCY)
Facility: HOSPITAL | Age: 39
Discharge: HOME | End: 2024-08-31
Attending: INTERNAL MEDICINE

## 2024-08-31 VITALS
HEART RATE: 80 BPM | DIASTOLIC BLOOD PRESSURE: 92 MMHG | HEIGHT: 71 IN | TEMPERATURE: 97 F | WEIGHT: 315 LBS | OXYGEN SATURATION: 95 % | SYSTOLIC BLOOD PRESSURE: 150 MMHG | RESPIRATION RATE: 20 BRPM | BODY MASS INDEX: 44.1 KG/M2

## 2024-08-31 DIAGNOSIS — M25.552 LEFT HIP PAIN: ICD-10-CM
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
## 2024-08-31 DIAGNOSIS — M54.50 ACUTE LEFT-SIDED LOW BACK PAIN WITHOUT SCIATICA: ICD-10-CM

## 2024-08-31 PROCEDURE — 25000003 PHARM REV CODE 250: Performed by: PHYSICIAN ASSISTANT

## 2024-08-31 PROCEDURE — 99284 EMERGENCY DEPT VISIT MOD MDM: CPT | Mod: 25

## 2024-08-31 RX ORDER — KETOROLAC TROMETHAMINE 10 MG/1
10 TABLET, FILM COATED ORAL
Status: COMPLETED | OUTPATIENT
Start: 2024-08-31 | End: 2024-08-31

## 2024-08-31 RX ORDER — DICLOFENAC SODIUM 50 MG/1
50 TABLET, DELAYED RELEASE ORAL 2 TIMES DAILY PRN
Qty: 20 TABLET | Refills: 0 | Status: SHIPPED | OUTPATIENT
Start: 2024-08-31

## 2024-08-31 RX ORDER — METHOCARBAMOL 500 MG/1
500 TABLET, FILM COATED ORAL
Status: COMPLETED | OUTPATIENT
Start: 2024-08-31 | End: 2024-08-31

## 2024-08-31 RX ORDER — METHOCARBAMOL 500 MG/1
500 TABLET, FILM COATED ORAL 3 TIMES DAILY PRN
Qty: 15 TABLET | Refills: 0 | Status: SHIPPED | OUTPATIENT
Start: 2024-08-31 | End: 2024-09-05

## 2024-08-31 RX ADMIN — KETOROLAC TROMETHAMINE 10 MG: 10 TABLET, FILM COATED ORAL at 09:08

## 2024-08-31 RX ADMIN — METHOCARBAMOL 500 MG: 500 TABLET ORAL at 09:08

## 2024-08-31 NOTE — ED PROVIDER NOTES
Encounter Date: 8/31/2024       History     Chief Complaint   Patient presents with    Fall     Presents from home with c/o sustaining a ground level fall 5 days ago and landing onto his buttocks. Reports increasing pain to left hip/buttock area and right knee since then.      Homero Crocker is a 39 y.o. male with a history of gout, HTN, obesity who presents to the ED complaining of left low back and left hip pain. Pt reports falling and landing onto his buttocks 5 days ago and has had pain since that time. He initially thought it was just a bruise, but when pain continued this am he decided to come in for evaluation. He did not hit his head during fall or lose consciousness. Denies numbness, paresthesias, bowel/bladder incontinence, saddle anesthesia.     The history is provided by the patient.     Review of patient's allergies indicates:  No Known Allergies  Past Medical History:   Diagnosis Date    Anemia, unspecified     Bilateral lower extremity edema     Body mass index (BMI) 50.0-59.9, adult     Decreased libido     Essential (primary) hypertension     Gout, unspecified     Male hypogonadism     Morbid (severe) obesity due to excess calories     Personal history of nicotine dependence      Past Surgical History:   Procedure Laterality Date    COSMETIC SURGERY  2000    Mole removal to back     Family History   Problem Relation Name Age of Onset    Depression Mother      Anxiety disorder Mother      Glaucoma Father      Hypertension Father      Depression Father       Social History     Tobacco Use    Smoking status: Never    Smokeless tobacco: Former     Types: Snuff     Quit date: 2018   Substance Use Topics    Alcohol use: Yes     Alcohol/week: 6.0 standard drinks of alcohol     Types: 6 Cans of beer per week     Comment: 6pk day    Drug use: Never     Review of Systems   Constitutional:  Negative for activity change, chills and fever.   HENT:  Negative for congestion and trouble swallowing.    Eyes:   Negative for photophobia and visual disturbance.   Respiratory:  Negative for chest tightness, shortness of breath and wheezing.    Cardiovascular:  Negative for chest pain, palpitations and leg swelling.   Gastrointestinal:  Negative for abdominal pain, constipation, diarrhea, nausea and vomiting.   Genitourinary:  Negative for dysuria, frequency, hematuria and urgency.   Musculoskeletal:  Positive for back pain. Negative for arthralgias and gait problem.   Skin:  Negative for color change and rash.   Neurological:  Negative for dizziness, syncope, weakness, light-headedness, numbness and headaches.   Psychiatric/Behavioral:  Negative for agitation and confusion. The patient is not nervous/anxious.        Physical Exam     Initial Vitals [08/31/24 0848]   BP Pulse Resp Temp SpO2   (!) 150/92 80 20 97.2 °F (36.2 °C) 95 %      MAP       --         Physical Exam    Nursing note and vitals reviewed.  Constitutional: He appears well-developed and well-nourished. No distress.   HENT:   Head: Normocephalic and atraumatic.   Mouth/Throat: No oropharyngeal exudate.   Eyes: EOM are normal. No scleral icterus.   Neck: Neck supple.   Normal range of motion.  Cardiovascular:  Normal rate and regular rhythm.           No murmur heard.  Pulmonary/Chest: No respiratory distress. He has no wheezes.   Abdominal: Abdomen is soft. He exhibits no distension. There is no abdominal tenderness.   Musculoskeletal:         General: No edema. Normal range of motion.      Cervical back: Normal range of motion and neck supple.      Comments: TTP over left gluteal/sciatic region. No midline C/T/L spinal tenderness. No TTP of left hip. Full ROM. NVI.      Neurological: He is alert and oriented to person, place, and time. No cranial nerve deficit.   Skin: Skin is warm and dry. Capillary refill takes less than 2 seconds. No erythema.   Psychiatric: He has a normal mood and affect. Thought content normal.         ED Course   Procedures  Labs  Reviewed - No data to display       Imaging Results              X-Ray Lumbar Spine Ap And Lateral (Final result)  Result time 08/31/24 10:14:17      Final result by Vasile Monroy MD (08/31/24 10:14:17)                   Impression:      No acute findings.      Electronically signed by: Vasile Monroy  Date:    08/31/2024  Time:    10:14               Narrative:    EXAMINATION:  XR LUMBAR SPINE AP AND LATERAL    CLINICAL HISTORY:  fall, low back pain;    COMPARISON:  5 August 2022    FINDINGS:  Frontal and lateral views of the lumbar spine.  Normal alignment.  No significant loss of vertebral body height.  Mild degenerative changes similar to prior.                                       X-Ray Hip 2 or 3 views Left with Pelvis when performed (Final result)  Result time 08/31/24 10:11:39      Final result by Vasile Monroy MD (08/31/24 10:11:39)                   Impression:      No acute findings.      Electronically signed by: Vasile Monroy  Date:    08/31/2024  Time:    10:11               Narrative:    EXAMINATION:  XR HIP WITH PELVIS WHEN PERFORMED 2 OR 3 VIEWS LEFT    CLINICAL HISTORY:  left hip pain, fall;    COMPARISON:  None    FINDINGS:  Frontal image of the pelvis with two views of the left hip.  There is no fracture or dislocation.                                       Medications   ketorolac tablet 10 mg (10 mg Oral Given 8/31/24 0944)   methocarbamoL tablet 500 mg (500 mg Oral Given 8/31/24 0944)     Medical Decision Making  Differential: strain, spasm, fracture, among others    ED management: XR without acute osseous abnormality. Suspect muscle strain. Pt stable for discharge with diclofenac and robaxin prn. Instructed to follow up with PCP in 3-5 days. ED return precautions given. He verbalized understanding. All questions answered.     Amount and/or Complexity of Data Reviewed  Radiology: ordered. Decision-making details documented in ED Course.    Risk  Prescription drug management.                ED Course as of 08/31/24 1024   Sat Aug 31, 2024   1021 X-Ray Lumbar Spine Ap And Lateral  No acute findings. [KD]   1021 X-Ray Hip 2 or 3 views Left with Pelvis when performed  No acute findings. [KD]      ED Course User Index  [KD] Tri Bowser PA-C                           Clinical Impression:  Final diagnoses:  [W19.XXXA] Fall, initial encounter (Primary)  [M25.552] Left hip pain  [M54.50] Acute left-sided low back pain without sciatica          ED Disposition Condition    Discharge Stable          ED Prescriptions       Medication Sig Dispense Start Date End Date Auth. Provider    diclofenac (VOLTAREN) 50 MG EC tablet Take 1 tablet (50 mg total) by mouth 2 (two) times daily as needed (pain). 20 tablet 8/31/2024 -- Tri Bowser PA-C    methocarbamoL (ROBAXIN) 500 MG Tab Take 1 tablet (500 mg total) by mouth 3 (three) times daily as needed (pain, spasms). 15 tablet 8/31/2024 9/5/2024 Tri Bowser PA-C          Follow-up Information       Follow up With Specialties Details Why Contact Info    [facility] Emergency Medicine  If symptoms worsen [address] [facility] [address] [phone]    Nahomy Lee, FNP Family Medicine In 3 days Hospital follow up [address]  [address] [facility] [address]  [phone]               Tri Bowser PA-C  08/31/24 1024

## 2024-08-31 NOTE — Clinical Note
"Homero "Homero" Carrier was seen and treated in our emergency department on 8/31/2024.  He may return to work on 09/02/2024.       If you have any questions or concerns, please don't hesitate to call.      Tri Bowser PA-C"

## 2024-09-02 DIAGNOSIS — E29.1 HYPOGONADISM IN MALE: ICD-10-CM

## 2024-09-02 RX ORDER — TESTOSTERONE CYPIONATE 200 MG/ML
200 INJECTION, SOLUTION INTRAMUSCULAR
Qty: 1 ML | Refills: 0 | Status: SHIPPED | OUTPATIENT
Start: 2024-09-02 | End: 2024-09-05 | Stop reason: SDUPTHER

## 2024-09-02 NOTE — TELEPHONE ENCOUNTER
A refill for one dose of testosterone was given to the patient.  He has an appointment on 5 September after having cancelled his appointment in August.  He will need to be seen before he can get anymore refills.

## 2024-09-05 ENCOUNTER — OFFICE VISIT (OUTPATIENT)
Dept: UROLOGY | Facility: CLINIC | Age: 39
End: 2024-09-05

## 2024-09-05 VITALS
RESPIRATION RATE: 20 BRPM | WEIGHT: 315 LBS | SYSTOLIC BLOOD PRESSURE: 99 MMHG | DIASTOLIC BLOOD PRESSURE: 64 MMHG | BODY MASS INDEX: 44.1 KG/M2 | HEART RATE: 89 BPM | TEMPERATURE: 98 F | OXYGEN SATURATION: 95 % | HEIGHT: 71 IN

## 2024-09-05 DIAGNOSIS — R79.89 HIGH SERUM ESTRADIOL: ICD-10-CM

## 2024-09-05 DIAGNOSIS — E29.1 HYPOGONADISM IN MALE: Primary | ICD-10-CM

## 2024-09-05 DIAGNOSIS — Q89.2 PITUITARY HYPOPLASIA: ICD-10-CM

## 2024-09-05 PROCEDURE — 99215 OFFICE O/P EST HI 40 MIN: CPT | Mod: PBBFAC | Performed by: UROLOGY

## 2024-09-05 PROCEDURE — 99214 OFFICE O/P EST MOD 30 MIN: CPT | Mod: S$PBB,,, | Performed by: UROLOGY

## 2024-09-05 RX ORDER — TESTOSTERONE CYPIONATE 200 MG/ML
200 INJECTION, SOLUTION INTRAMUSCULAR
Qty: 2.5 ML | Refills: 5 | Status: SHIPPED | OUTPATIENT
Start: 2024-09-05

## 2024-09-05 NOTE — LETTER
September 5, 2024      [facility]  [address] [facility] [address]  Phone: [phone]       Patient: Homero Crocker   YOB: 1985  Date of Visit: 09/05/2024    To Whom It May Concern:    Jazmyn Crocker  was at [facility] on 09/05/2024. The patient may return to work/school on 09/06/2024, no restrictions. If you have any questions or concerns, or if I can be of further assistance, please do not hesitate to contact me.    Sincerely,    Cyn Cano LPN

## 2024-09-05 NOTE — PROGRESS NOTES
I saw and evaluated the patient with the resident.  I discussed with the resident and agree with the resident's history, physical, assessment, findings and care plan as documented in the resident's note.        Angela Barragan DO  Urology  [facility]

## 2024-09-05 NOTE — PROGRESS NOTES
CC:  Hypogonadism    HPI:  Homero Crocker is a 39 y.o. male seen for follow-up of Hypogonadism.  He is currently on 200 mg every two weeks of testosterone cypionate. Also taking anastrozole due to a high estradiol levels. He has been doing this for the last nine months and is feeling much better. He has more energy and just overall feels better.   Admits his ejaculations concerns from last apt has completely resolved. Still currently on Lexapro.       Urinalysis:  Results for orders placed or performed in visit on 02/07/24   POCT URINE DIPSTICK WITH MICROSCOPE, AUTOMATED   Result Value Ref Range    Color, UA Yellow     Spec Grav UA 1.030     pH, UA 5.5     WBC, UA Negative     Nitrite, UA Negative     Protein, POC Negative     Glucose, UA Negative     Ketones, UA Negative     Urobilinogen, UA 0.2     Bilirubin, POC Negative     Blood, UA Negative      Microscopic Urinalysis:  WBC:   None per HPF     RBC:    None per HPF     Bacteria:    None per HPF     Squamous epithelial cells:  None per HPF      Crystals:   None    Lab Results:  PSA History:      Results for orders placed or performed in visit on 08/19/24   Testosterone   Result Value Ref Range    Testosterone Total 594.93 240.24 - 870.68 ng/dL     Recent Labs     08/19/24  0608   PSA 1.51       Recent Labs     08/19/24  0608   ESTRADIOL 32     ROS:  All systems reviewed and are negative except as documented in HPI and/or Assessment and Plan.     Patient Active Problem List:     Patient Active Problem List   Diagnosis    Vitamin D deficiency    Sleep apnea syndrome    Decreased libido    Male hypogonadism    Morbid (severe) obesity due to excess calories        Past Medical History:  Past Medical History:   Diagnosis Date    Anemia, unspecified     Bilateral lower extremity edema     Body mass index (BMI) 50.0-59.9, adult     Decreased libido     Essential (primary) hypertension     Gout, unspecified     Male hypogonadism     Morbid (severe) obesity due to excess  calories     Personal history of nicotine dependence         Past Surgical History:  Past Surgical History:   Procedure Laterality Date    COSMETIC SURGERY  2000    Mole removal to back        Family History:  Family History   Problem Relation Name Age of Onset    Depression Mother      Anxiety disorder Mother      Glaucoma Father      Hypertension Father      Depression Father          Social History:  Social History     Socioeconomic History    Marital status:    Tobacco Use    Smoking status: Never     Passive exposure: Past    Smokeless tobacco: Former     Types: Snuff     Quit date: 2018   Substance and Sexual Activity    Alcohol use: Yes     Alcohol/week: 6.0 standard drinks of alcohol     Types: 6 Cans of beer per week     Comment: 6pk day    Drug use: Never    Sexual activity: Yes     Partners: Female     Birth control/protection: None        Allergies:  Review of patient's allergies indicates:  No Known Allergies     Objective:  Vitals:    09/05/24 1520   BP: 99/64   Pulse: 89   Resp: 20   Temp: 98.1 °F (36.7 °C)     General:  Well developed, well nourished adult male in no acute distress  Abdomen: Soft, nontender, no masses  Extremities:  No clubbing, cyanosis, or edema  Neurologic:  Grossly intact  Musculoskeletal:  Normal tone    Assessment:  1. Hypogonadism in male  - POCT URINE DIPSTICK WITH MICROSCOPE, AUTOMATED  - testosterone cypionate (DEPOTESTOTERONE CYPIONATE) 200 mg/mL injection; Inject 1 mL (200 mg total) into the muscle every 14 (fourteen) days.  Dispense: 2.5 mL; Refill: 5  - Estradiol; Future  - Testosterone; Future  - CBC Auto Differential; Future    2. High serum estradiol    3. Pituitary hypoplasia     Plan:  -Lab work within normal limits today.   -Continue current Testerone regimen with 200mg every 2 weeks. Refills previously provided.  -Pt recently changed insurance and Anastrozole now costing him $1100. Will stop medication when he runs out and assess levels next apt.      Follow-up:  Six months with labs.

## 2024-09-28 DIAGNOSIS — F32.A DEPRESSION, UNSPECIFIED DEPRESSION TYPE: ICD-10-CM

## 2024-09-30 RX ORDER — ESCITALOPRAM OXALATE 20 MG/1
20 TABLET ORAL
Qty: 30 TABLET | Refills: 0 | Status: SHIPPED | OUTPATIENT
Start: 2024-09-30

## 2024-10-17 ENCOUNTER — TELEPHONE (OUTPATIENT)
Dept: FAMILY MEDICINE | Facility: CLINIC | Age: 39
End: 2024-10-17

## 2024-10-17 DIAGNOSIS — Z00.00 WELLNESS EXAMINATION: Primary | ICD-10-CM

## 2024-10-18 ENCOUNTER — HOSPITAL ENCOUNTER (EMERGENCY)
Facility: HOSPITAL | Age: 39
Discharge: HOME | End: 2024-10-18
Attending: STUDENT IN AN ORGANIZED HEALTH CARE EDUCATION/TRAINING PROGRAM

## 2024-10-18 VITALS
DIASTOLIC BLOOD PRESSURE: 79 MMHG | SYSTOLIC BLOOD PRESSURE: 116 MMHG | TEMPERATURE: 98 F | WEIGHT: 315 LBS | RESPIRATION RATE: 16 BRPM | BODY MASS INDEX: 44.1 KG/M2 | OXYGEN SATURATION: 99 % | HEART RATE: 95 BPM | HEIGHT: 71 IN

## 2024-10-18 DIAGNOSIS — M25.552 PAIN OF LEFT HIP: Primary | ICD-10-CM

## 2024-10-18 DIAGNOSIS — S76.012A HIP STRAIN, LEFT, INITIAL ENCOUNTER: ICD-10-CM

## 2024-10-18 PROCEDURE — 63600175 PHARM REV CODE 636 W HCPCS: Performed by: STUDENT IN AN ORGANIZED HEALTH CARE EDUCATION/TRAINING PROGRAM

## 2024-10-18 PROCEDURE — 99284 EMERGENCY DEPT VISIT MOD MDM: CPT | Mod: 25

## 2024-10-18 PROCEDURE — 96372 THER/PROPH/DIAG INJ SC/IM: CPT | Performed by: STUDENT IN AN ORGANIZED HEALTH CARE EDUCATION/TRAINING PROGRAM

## 2024-10-18 RX ORDER — ORPHENADRINE CITRATE 30 MG/ML
60 INJECTION INTRAMUSCULAR; INTRAVENOUS
Status: COMPLETED | OUTPATIENT
Start: 2024-10-18 | End: 2024-10-18

## 2024-10-18 RX ORDER — METHOCARBAMOL 1000 MG/1
1000 TABLET, FILM COATED ORAL 3 TIMES DAILY
Qty: 30 TABLET | Refills: 0 | Status: SHIPPED | OUTPATIENT
Start: 2024-10-18 | End: 2024-10-23

## 2024-10-18 RX ORDER — KETOROLAC TROMETHAMINE 30 MG/ML
30 INJECTION, SOLUTION INTRAMUSCULAR; INTRAVENOUS
Status: COMPLETED | OUTPATIENT
Start: 2024-10-18 | End: 2024-10-18

## 2024-10-18 RX ORDER — KETOROLAC TROMETHAMINE 10 MG/1
10 TABLET, FILM COATED ORAL EVERY 12 HOURS PRN
Qty: 10 TABLET | Refills: 0 | Status: SHIPPED | OUTPATIENT
Start: 2024-10-18 | End: 2024-10-23

## 2024-10-18 RX ADMIN — ORPHENADRINE CITRATE 60 MG: 30 INJECTION, SOLUTION INTRAMUSCULAR; INTRAVENOUS at 06:10

## 2024-10-18 RX ADMIN — KETOROLAC TROMETHAMINE 30 MG: 30 INJECTION, SOLUTION INTRAMUSCULAR at 06:10

## 2024-10-18 NOTE — Clinical Note
Qi Crocker accompanied their family member to the emergency department on 10/18/2024. They may return to work on 10/21/2024.      If you have any questions or concerns, please don't hesitate to call.      Corbin Watters MD

## 2024-10-18 NOTE — DISCHARGE INSTRUCTIONS
Follow-up with the primary care physician.      If you continue to have pain follow-up with orthopedic surgery.      You may take muscle relaxer and anti-inflammatory as prescribed.      Return to the emergency department if any new or worsening symptoms, chest pain, shortness of breath, nausea, vomiting, or any other concerns.

## 2024-10-18 NOTE — Clinical Note
"Homero "Homero" Carrier was seen and treated in our emergency department on 10/18/2024.  He may return to work on 10/22/2024.       If you have any questions or concerns, please don't hesitate to call.      Corbin Watters MD"

## 2024-10-18 NOTE — ED PROVIDER NOTES
Encounter Date: 10/18/2024    SCRIBE #1 NOTE: I, Ailyn Roche, am scribing for, and in the presence of,  Corbin Watters MD. I have scribed the following portions of the note - Other sections scribed: HPI, ROS, PE.       History     Chief Complaint   Patient presents with    Hip Pain     Patient c/o left hip pain. Patient fell at work 1.5 months ago and was treated with relief. Patient states pain is back and no OTC medications give pain relief. Patient states pain radiating down to left knee. PMS intact. MONTES.      Patient is a 40 y/o male with a PMHx of anemia, HTN, gout presents to the ED for left hip pain worsening 2 days ago. Patient reports having a ground level fall at work in August. 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 He denies hitting his head. He reports presenting to [facility] following the fall. He reports being prescribed pain medication and was fine until 2 days ago. He reports worsening left hip pain beginning to radiate into his left knee states may have pulled something. He reports difficulty walking due to pain.     The history is provided by the patient and medical records. No  was used.     Review of patient's allergies indicates:  No Known Allergies  Past Medical History:   Diagnosis Date    Anemia, unspecified     Bilateral lower extremity edema     Body mass index (BMI) 50.0-59.9, adult     Decreased libido     Essential (primary) hypertension     Gout, unspecified     Male hypogonadism     Morbid (severe) obesity due to excess calories     Personal history of nicotine dependence      Past Surgical History:   Procedure Laterality Date    COSMETIC SURGERY  2000    Mole removal to back     Family History   Problem Relation Name Age of Onset    Depression Mother      Anxiety disorder Mother      Glaucoma Father      Hypertension Father      Depression Father       Social History     Tobacco Use    Smoking status: Never     Passive exposure: Past     Smokeless tobacco: Former     Types: Snuff     Quit date: 2018   Substance Use Topics    Alcohol use: Yes     Alcohol/week: 6.0 standard drinks of alcohol     Types: 6 Cans of beer per week     Comment: 6pk day    Drug use: Never     Review of Systems   Constitutional:  Negative for fever.   HENT:  Negative for sore throat.    Eyes:  Negative for visual disturbance.   Respiratory:  Negative for shortness of breath.    Cardiovascular:  Negative for chest pain.   Gastrointestinal:  Negative for abdominal pain.   Genitourinary:  Negative for dysuria.   Musculoskeletal:  Negative for joint swelling.        L hip pain   Skin:  Negative for rash.   Neurological:  Negative for weakness.   Psychiatric/Behavioral:  Negative for confusion.    All other systems reviewed and are negative.      Physical Exam     Initial Vitals [10/18/24 0554]   BP Pulse Resp Temp SpO2   123/80 98 16 98.2 °F (36.8 °C) 95 %      MAP       --         Physical Exam    Nursing note and vitals reviewed.  Constitutional: He appears well-developed and well-nourished. He is not diaphoretic. No distress.   HENT:   Head: Normocephalic and atraumatic.   Eyes: Conjunctivae and EOM are normal. Pupils are equal, round, and reactive to light.   Neck:   Normal range of motion.  Cardiovascular:  Normal rate, regular rhythm, normal heart sounds and intact distal pulses.           No murmur heard.  Pulmonary/Chest: Breath sounds normal. No respiratory distress. He has no wheezes. He has no rales.   Abdominal: Abdomen is soft. He exhibits no distension. There is no abdominal tenderness.   Musculoskeletal:         General: No edema. Normal range of motion.      Cervical back: Normal range of motion.      Comments: Mild left posterior hip tenderness to palpation.     Neurological: He is alert and oriented to person, place, and time. No cranial nerve deficit.   Skin: Skin is warm and dry. Capillary refill takes less than 2 seconds. No rash noted. No erythema.    Psychiatric: He has a normal mood and affect.         ED Course   Procedures  Labs Reviewed - No data to display       Imaging Results              X-Ray Hip 2 or 3 views Left with Pelvis when performed (Final result)  Result time 10/18/24 06:51:54      Final result by Mack Palmer MD (10/18/24 06:51:54)                   Narrative:    EXAMINATION  XR HIP WITH PELVIS WHEN PERFORMED 2 OR 3 VIEWS LEFT    CLINICAL HISTORY  left hip pain;    TECHNIQUE  A total of 3 images submitted of the left hip/pelvis.    COMPARISON  31 August 2024    FINDINGS  No displaced fracture or dislocation is identified. The visualized pelvic ring structures and articular spaces are preserved. There is no suggestion of large joint effusion. No aggressive osseous lesion or periosteal reaction is appreciated. The trabecular pattern is unremarkable.    The included soft tissues are without acute abnormality.    IMPRESSION  No new or worsening radiographic abnormality.      Electronically signed by: Mack Palmer  Date:    10/18/2024  Time:    06:51                                     Medications   ketorolac injection 30 mg (30 mg Intramuscular Given 10/18/24 0614)   orphenadrine injection 60 mg (60 mg Intramuscular Given 10/18/24 0614)     Medical Decision Making  Judging by the patient's chief complaint and pertinent history, the patient has the following possible differential diagnoses, including but not limited to the following.  Some of these are deemed to be lower likelihood and some more likely based on my physical exam and history combined with possible lab work and/or imaging studies.   Please see the pertinent studies, and refer to the HPI.  Some of these diagnoses will take further evaluation to fully rule out, perhaps as an outpatient and the patient was encouraged to follow up when discharged for more comprehensive evaluation.      contusion, fracture, musculoskeletal strain     Patient is a 39-year-old male presents to  emergency department for left hip pain.  See HPI.  See physical exam.  No deformity.  Able to stand and ambulate.  Imaging without evidence of fracture.  Suspect likely sprain/strain.  Pain improved on reassessment.  Reassessed patient.  Patient is resting comfortably.  Discussed all results.  Discussed need for follow-up.  Discussed return precautions.  Answered all questions at this time.  Hemodynamically stable for continued outpatient management with strict return precautions.  Patient and family verbalized understanding agreed to plan.      Problems Addressed:  Hip strain, left, initial encounter: acute illness or injury that poses a threat to life or bodily functions  Pain of left hip: acute illness or injury that poses a threat to life or bodily functions    Amount and/or Complexity of Data Reviewed  Radiology: ordered and independent interpretation performed.    Risk  Prescription drug management.            Scribe Attestation:   Scribe #1: I performed the above scribed service and the documentation accurately describes the services I performed. I attest to the accuracy of the note.    Attending Attestation:           Physician Attestation for Scribe:  Physician Attestation Statement for Scribe #1: I, Corbin Watters MD, reviewed documentation, as scribed by Ailyn Roche in my presence, and it is both accurate and complete.                                    Clinical Impression:  Final diagnoses:  [M25.552] Pain of left hip (Primary)  [S76.012A] Hip strain, left, initial encounter          ED Disposition Condition    Discharge           ED Prescriptions       Medication Sig Dispense Start Date End Date Auth. Provider    ketorolac (TORADOL) 10 mg tablet () Take 1 tablet (10 mg total) by mouth every 12 (twelve) hours as needed for Pain. 10 tablet 10/18/2024 10/23/2024 Corbin Watters MD    methocarbamoL 1,000 mg Tab () Take 1,000 mg by mouth 3 (three) times daily. for 5 days 30 tablet 10/18/2024  10/23/2024 Corbin Watters MD          Follow-up Information       Follow up With Specialties Details Why Contact Info    Nahomy Lee, [facility]   [address]  [address]  [facility] [place] [facility] [address]  [phone]      Primary Care  Call in 1 day  Please call [phone] for a primary care provider.    Torres Whiteside Jr., MD Orthopedic Surgery   [address]  [address]  [place] [address]  [phone]      Rusty Mitchell MD Orthopedic Surgery   [address]  [address]  [place] [facility] [place] [facility] [address]  [phone]               Corbin Watters MD  11/12/24 0546

## 2024-10-23 ENCOUNTER — HOSPITAL ENCOUNTER (EMERGENCY)
Facility: HOSPITAL | Age: 39
Discharge: HOME | End: 2024-10-23
Attending: EMERGENCY MEDICINE

## 2024-10-23 VITALS
HEIGHT: 71 IN | RESPIRATION RATE: 20 BRPM | SYSTOLIC BLOOD PRESSURE: 122 MMHG | HEART RATE: 88 BPM | WEIGHT: 315 LBS | OXYGEN SATURATION: 97 % | TEMPERATURE: 98 F | DIASTOLIC BLOOD PRESSURE: 80 MMHG | BODY MASS INDEX: 44.1 KG/M2

## 2024-10-23 DIAGNOSIS — M77.31 CALCANEAL SPUR OF FOOT, RIGHT: Primary | ICD-10-CM

## 2024-10-23 DIAGNOSIS — E66.01 MORBID OBESITY: ICD-10-CM

## 2024-10-23 DIAGNOSIS — M72.2 PLANTAR FASCIITIS: ICD-10-CM

## 2024-10-23 DIAGNOSIS — R52 PAIN AGGRAVATED BY ACTIVITIES OF DAILY LIVING: ICD-10-CM

## 2024-10-23 PROCEDURE — 96372 THER/PROPH/DIAG INJ SC/IM: CPT

## 2024-10-23 PROCEDURE — 99284 EMERGENCY DEPT VISIT MOD MDM: CPT | Mod: 25

## 2024-10-23 PROCEDURE — 25000003 PHARM REV CODE 250

## 2024-10-23 PROCEDURE — 63600175 PHARM REV CODE 636 W HCPCS

## 2024-10-23 RX ORDER — DEXAMETHASONE SODIUM PHOSPHATE 4 MG/ML
8 INJECTION, SOLUTION INTRA-ARTICULAR; INTRALESIONAL; INTRAMUSCULAR; INTRAVENOUS; SOFT TISSUE
Status: COMPLETED | OUTPATIENT
Start: 2024-10-23 | End: 2024-10-23

## 2024-10-23 RX ORDER — INDOMETHACIN 25 MG/1
50 CAPSULE ORAL
Status: COMPLETED | OUTPATIENT
Start: 2024-10-23 | End: 2024-10-23

## 2024-10-23 RX ORDER — INDOMETHACIN 50 MG/1
50 CAPSULE ORAL
Qty: 21 CAPSULE | Refills: 0 | Status: SHIPPED | OUTPATIENT
Start: 2024-10-23 | End: 2024-10-30

## 2024-10-23 RX ADMIN — DEXAMETHASONE SODIUM PHOSPHATE 8 MG: 4 INJECTION, SOLUTION INTRA-ARTICULAR; INTRALESIONAL; INTRAMUSCULAR; INTRAVENOUS; SOFT TISSUE at 10:10

## 2024-10-23 RX ADMIN — INDOMETHACIN 50 MG: 25 CAPSULE ORAL at 10:10

## 2024-10-25 DIAGNOSIS — F32.A DEPRESSION, UNSPECIFIED DEPRESSION TYPE: ICD-10-CM

## 2024-10-25 RX ORDER — ESCITALOPRAM OXALATE 20 MG/1
20 TABLET ORAL
Qty: 30 TABLET | Refills: 0 | Status: SHIPPED | OUTPATIENT
Start: 2024-10-25

## 2024-11-25 ENCOUNTER — TELEPHONE (OUTPATIENT)
Dept: FAMILY MEDICINE | Facility: CLINIC | Age: 39
End: 2024-11-25

## 2024-11-25 DIAGNOSIS — F32.A DEPRESSION, UNSPECIFIED DEPRESSION TYPE: ICD-10-CM

## 2024-11-25 RX ORDER — ESCITALOPRAM OXALATE 20 MG/1
TABLET ORAL
Qty: 30 TABLET | Refills: 0 | Status: SHIPPED | OUTPATIENT
Start: 2024-11-25

## 2024-12-04 ENCOUNTER — PATIENT MESSAGE (OUTPATIENT)
Dept: UROLOGY | Facility: CLINIC | Age: 39
End: 2024-12-04

## 2024-12-05 RX ORDER — TADALAFIL 20 MG/1
20 TABLET ORAL DAILY PRN
Qty: 30 TABLET | Refills: 11 | Status: SHIPPED | OUTPATIENT
Start: 2024-12-05 | End: 2025-12-05

## 2025-01-28 ENCOUNTER — HOSPITAL ENCOUNTER (EMERGENCY)
Facility: HOSPITAL | Age: 40
Discharge: HOME | End: 2025-01-28
Attending: INTERNAL MEDICINE

## 2025-01-28 VITALS
TEMPERATURE: 98 F | HEIGHT: 71 IN | HEART RATE: 84 BPM | DIASTOLIC BLOOD PRESSURE: 100 MMHG | RESPIRATION RATE: 20 BRPM | BODY MASS INDEX: 44.1 KG/M2 | OXYGEN SATURATION: 100 % | SYSTOLIC BLOOD PRESSURE: 149 MMHG | WEIGHT: 315 LBS

## 2025-01-28 DIAGNOSIS — M25.561 ACUTE PAIN OF RIGHT KNEE: Primary | ICD-10-CM

## 2025-01-28 DIAGNOSIS — M10.9 GOUTY ARTHRITIS: ICD-10-CM

## 2025-01-28 PROCEDURE — 99284 EMERGENCY DEPT VISIT MOD MDM: CPT | Mod: 25

## 2025-01-28 PROCEDURE — 63600175 PHARM REV CODE 636 W HCPCS: Mod: JZ,TB | Performed by: NURSE PRACTITIONER

## 2025-01-28 PROCEDURE — 96372 THER/PROPH/DIAG INJ SC/IM: CPT | Performed by: NURSE PRACTITIONER

## 2025-01-28 RX ORDER — KETOROLAC TROMETHAMINE 30 MG/ML
30 INJECTION, SOLUTION INTRAMUSCULAR; INTRAVENOUS
Status: COMPLETED | OUTPATIENT
Start: 2025-01-28 | End: 2025-01-28

## 2025-01-28 RX ADMIN — KETOROLAC TROMETHAMINE 30 MG: 30 INJECTION, SOLUTION INTRAMUSCULAR at 10:01

## 2025-01-28 NOTE — Clinical Note
"Homero "Homero" Carrier was seen and treated in our emergency department on 1/28/2025.  He may return to work on 01/29/2025.       If you have any questions or concerns, please don't hesitate to call.      Doe Echevarria MD"

## 2025-01-28 NOTE — ED PROVIDER NOTES
Encounter Date: 1/28/2025       History     Chief Complaint   Patient presents with    Knee Pain     TO ED WITH C/O GOUT PAIN TO RIGHT KNEE     The patient presents with knee pain and swelling. The onset was a week ago.  The course/duration of symptoms is constant. Type of injury: none and none known. Location: Right knee. The character of symptoms is pain and swelling.  The degree at present is moderate. There are exacerbating factors including movement, weight bearing and walking.  The relieving factor is rest. Risk factors consist of recurrent gout. Prior episodes: occasional. Therapy today: none. Associated symptoms: none. He has medication at home and is only requesting IM pain medication.      Review of patient's allergies indicates:  No Known Allergies  Past Medical History:   Diagnosis Date    Anemia, unspecified     Bilateral lower extremity edema     Body mass index (BMI) 50.0-59.9, adult     Decreased libido     Essential (primary) hypertension     Gout, unspecified     Male hypogonadism     Morbid (severe) obesity due to excess calories     Personal history of nicotine dependence      Past Surgical History:   Procedure Laterality Date    COSMETIC SURGERY  2000    Mole removal to back     Family History   Problem Relation Name Age of Onset    Depression Mother      Anxiety disorder Mother      Glaucoma Father      Hypertension Father      Depression Father       Social History     Tobacco Use    Smoking status: Never     Passive exposure: Past    Smokeless tobacco: Former     Types: Snuff     Quit date: 2018   Substance Use Topics    Alcohol use: Yes     Alcohol/week: 6.0 standard drinks of alcohol     Types: 6 Cans of beer per week     Comment: 6pk day    Drug use: Never     Review of Systems   Constitutional:  Negative for fever.   HENT:  Negative for sore throat.    Respiratory:  Negative for shortness of breath.    Cardiovascular:  Negative for chest pain.   Gastrointestinal:  Negative for nausea.    Genitourinary:  Negative for dysuria.   Musculoskeletal:  Positive for arthralgias. Negative for back pain.   Skin:  Negative for rash.   Neurological:  Negative for weakness.   Hematological:  Does not bruise/bleed easily.   All other systems reviewed and are negative.      Physical Exam     Initial Vitals [01/28/25 0929]   BP Pulse Resp Temp SpO2   (!) 144/75 87 18 98 °F (36.7 °C) 97 %      MAP       --         Physical Exam    Nursing note and vitals reviewed.  Constitutional: He appears well-developed and well-nourished.   HENT:   Head: Normocephalic and atraumatic.   Neck: Neck supple.   Normal range of motion.  Cardiovascular:  Normal rate, regular rhythm, normal heart sounds and intact distal pulses.           Pulmonary/Chest: Effort normal and breath sounds normal. He has no decreased breath sounds.   Abdominal: Abdomen is soft and flat. Bowel sounds are normal. There is no abdominal tenderness.   Musculoskeletal:         General: Normal range of motion.      Cervical back: Normal range of motion and neck supple.      Comments: Right Knee: mild ttp and swelling, FROM, good distal pulses, NVI, no calf ttp or swelling      Neurological: He is alert and oriented to person, place, and time. He has normal strength.   Skin: Skin is warm and dry.   Psychiatric: He has a normal mood and affect.         ED Course   Procedures  Labs Reviewed - No data to display       Imaging Results    None          Medications   ketorolac injection 30 mg (30 mg Intramuscular Given 1/28/25 1002)     Medical Decision Making  The patient presents with knee pain and swelling. The onset was a week ago.  The course/duration of symptoms is constant. Type of injury: none and none known. Location: Right knee. The character of symptoms is pain and swelling.  The degree at present is moderate. There are exacerbating factors including movement, weight bearing and walking.  The relieving factor is rest. Risk factors consist of recurrent gout.  Prior episodes: occasional. Therapy today: none. Associated symptoms: none. He has medication at home and is only requesting IM pain medication.    Declines xray of knee. IM ketorolac given in the ER. Will refer to medicine clinic per request for a pcp.10:10 AM DISPOSITION: The patient is resting comfortably in no acute distress.  He is hemodynamically stable and is without objective evidence for acute process requiring urgent intervention or hospitalization. I provided counseling to patient with regard to condition, the treatment plan, specific conditions for return, and the importance of follow up. Detailed written and verbal instructions provided to patient and he expressed a verbal understanding of the discharge instructions and overall management plan. Reiterated the importance of medication administration and safety and advised patient to follow up with primary care provider in 3-5 days or sooner if needed.  Answered questions at this time. The patient is stable for discharge.       Risk  Prescription drug management.      Additional MDM:   Differential Diagnosis:   At this time differential diagnosis is but not limited to fracture, sprain, contusion, arthritis, gout             ED Course as of 01/28/25 1012   Tue Jan 28, 2025   1010 Given strict ED return precautions. I have spoken with the patient and/or caregivers. I have explained the patient's condition, diagnoses and treatment plan based on the information available to me at this time. I have answered the patient's and/or caregiver's questions and addressed any concerns. The patient and/or caregivers have as good an understanding of the patient's diagnosis, condition and treatment plan as can be expected at this point. The vital signs have been stable. The patient's condition is stable and appropriate for discharge from the emergency department.      The patient will pursue further outpatient evaluation with the primary care physician or other designated  or consulting physician as outlined in the discharge instructions. The patient and/or caregivers are agreeable to this plan of care and follow-up instructions have been explained in detail. The patient and/or caregivers have received these instructions in written format and have expressed an understanding of the discharge instructions. The patient and/or caregivers are aware that any significant change in condition or worsening of symptoms should prompt an immediate return to this or the closest emergency department or a call to 911.      [RB]      ED Course User Index  [RB] Jakub Neely ACNP                           Clinical Impression:  Final diagnoses:  [M25.561] Acute pain of right knee (Primary)  [M10.9] Gouty arthritis          ED Disposition Condition    Discharge Stable          ED Prescriptions    None       Follow-up Information       Follow up With Specialties Details Why Contact Info    referral sent to medicine clinic per request for a primary care provider        [facility] Emergency Medicine  If symptoms worsen [address] [facility] [address]  [phone]             Jakub Neely ACNP  01/28/25 1013

## 2025-02-07 DIAGNOSIS — F32.A DEPRESSION, UNSPECIFIED DEPRESSION TYPE: ICD-10-CM

## 2025-02-07 DIAGNOSIS — I10 HYPERTENSION, UNSPECIFIED TYPE: ICD-10-CM

## 2025-02-10 RX ORDER — LISINOPRIL AND HYDROCHLOROTHIAZIDE 20; 25 MG/1; MG/1
1 TABLET ORAL DAILY
Qty: 30 TABLET | Refills: 0 | Status: SHIPPED | OUTPATIENT
Start: 2025-02-10

## 2025-02-10 RX ORDER — ESCITALOPRAM OXALATE 20 MG/1
20 TABLET ORAL DAILY
Qty: 30 TABLET | Refills: 0 | Status: SHIPPED | OUTPATIENT
Start: 2025-02-10

## 2025-02-25 DIAGNOSIS — E29.1 HYPOGONADISM IN MALE: ICD-10-CM

## 2025-02-25 RX ORDER — TESTOSTERONE CYPIONATE 200 MG/ML
200 INJECTION, SOLUTION INTRAMUSCULAR
Qty: 2.5 ML | Refills: 5 | OUTPATIENT
Start: 2025-02-25

## 2025-02-27 ENCOUNTER — LAB VISIT (OUTPATIENT)
Dept: LAB | Facility: HOSPITAL | Age: 40
End: 2025-02-27

## 2025-02-27 DIAGNOSIS — E29.1 HYPOGONADISM IN MALE: ICD-10-CM

## 2025-02-27 LAB
BASOPHILS # BLD AUTO: 0.08 X10(3)/MCL
BASOPHILS NFR BLD AUTO: 0.9 %
EOSINOPHIL # BLD AUTO: 0.23 X10(3)/MCL (ref 0–0.9)
EOSINOPHIL NFR BLD AUTO: 2.7 %
ERYTHROCYTE [DISTWIDTH] IN BLOOD BY AUTOMATED COUNT: 13.4 % (ref 11.5–17)
ESTRADIOL SERPL HS-MCNC: 41 PG/ML
HCT VFR BLD AUTO: 45.2 % (ref 42–52)
HGB BLD-MCNC: 15.2 G/DL (ref 14–18)
IMM GRANULOCYTES # BLD AUTO: 0.02 X10(3)/MCL (ref 0–0.04)
IMM GRANULOCYTES NFR BLD AUTO: 0.2 %
LYMPHOCYTES # BLD AUTO: 2.25 X10(3)/MCL (ref 0.6–4.6)
LYMPHOCYTES NFR BLD AUTO: 26.4 %
MCH RBC QN AUTO: 31.5 PG (ref 27–31)
MCHC RBC AUTO-ENTMCNC: 33.6 G/DL (ref 33–36)
MCV RBC AUTO: 93.6 FL (ref 80–94)
MONOCYTES # BLD AUTO: 0.96 X10(3)/MCL (ref 0.1–1.3)
MONOCYTES NFR BLD AUTO: 11.3 %
NEUTROPHILS # BLD AUTO: 4.99 X10(3)/MCL (ref 2.1–9.2)
NEUTROPHILS NFR BLD AUTO: 58.5 %
NRBC BLD AUTO-RTO: 0 %
PLATELET # BLD AUTO: 305 X10(3)/MCL (ref 130–400)
PMV BLD AUTO: 9.9 FL (ref 7.4–10.4)
RBC # BLD AUTO: 4.83 X10(6)/MCL (ref 4.7–6.1)
TESTOST SERPL-MCNC: 86.2 NG/DL (ref 240.24–870.68)
WBC # BLD AUTO: 8.53 X10(3)/MCL (ref 4.5–11.5)

## 2025-02-27 PROCEDURE — 82670 ASSAY OF TOTAL ESTRADIOL: CPT

## 2025-02-27 PROCEDURE — 84403 ASSAY OF TOTAL TESTOSTERONE: CPT

## 2025-02-27 PROCEDURE — 85025 COMPLETE CBC W/AUTO DIFF WBC: CPT

## 2025-02-27 PROCEDURE — 36415 COLL VENOUS BLD VENIPUNCTURE: CPT

## 2025-03-05 ENCOUNTER — OFFICE VISIT (OUTPATIENT)
Dept: UROLOGY | Facility: CLINIC | Age: 40
End: 2025-03-05

## 2025-03-05 VITALS
TEMPERATURE: 98 F | SYSTOLIC BLOOD PRESSURE: 124 MMHG | DIASTOLIC BLOOD PRESSURE: 67 MMHG | HEIGHT: 70 IN | HEART RATE: 69 BPM | OXYGEN SATURATION: 96 % | WEIGHT: 315 LBS | RESPIRATION RATE: 20 BRPM | BODY MASS INDEX: 45.1 KG/M2

## 2025-03-05 DIAGNOSIS — N52.8 OTHER MALE ERECTILE DYSFUNCTION: ICD-10-CM

## 2025-03-05 DIAGNOSIS — E29.1 HYPOGONADISM IN MALE: Primary | ICD-10-CM

## 2025-03-05 DIAGNOSIS — R79.89 HIGH SERUM ESTRADIOL: ICD-10-CM

## 2025-03-05 PROCEDURE — 99214 OFFICE O/P EST MOD 30 MIN: CPT | Mod: S$PBB,,, | Performed by: UROLOGY

## 2025-03-05 PROCEDURE — 99214 OFFICE O/P EST MOD 30 MIN: CPT | Mod: PBBFAC | Performed by: UROLOGY

## 2025-03-05 PROCEDURE — 96372 THER/PROPH/DIAG INJ SC/IM: CPT | Mod: PBBFAC

## 2025-03-05 RX ORDER — TESTOSTERONE CYPIONATE 200 MG/ML
200 INJECTION, SOLUTION INTRAMUSCULAR
Status: COMPLETED | OUTPATIENT
Start: 2025-03-05 | End: 2025-03-05

## 2025-03-05 RX ORDER — TESTOSTERONE CYPIONATE 200 MG/ML
200 INJECTION, SOLUTION INTRAMUSCULAR
Qty: 2.5 ML | Refills: 5 | Status: SHIPPED | OUTPATIENT
Start: 2025-03-05

## 2025-03-05 RX ORDER — ANASTROZOLE 1 MG/1
1 TABLET ORAL DAILY
Qty: 90 TABLET | Refills: 3 | Status: SHIPPED | OUTPATIENT
Start: 2025-03-05

## 2025-03-05 RX ADMIN — TESTOSTERONE CYPIONATE 200 MG: 200 INJECTION, SOLUTION INTRAMUSCULAR at 04:03

## 2025-03-05 NOTE — PROGRESS NOTES
CC:  Hypogonadism    HPI:  Homero Crocker is a 39 y.o. male seen for follow-up of hypogonadism.  He is currently on 200 mg every two weeks of testosterone cypionate. He was taking anastrazole but it costs too much so he isn't taking it now.  He is late for this injection.  His last injection was due about three weeks ago but he had run out of medication and therefore did not have an injection at that time.  He is using Tadalafil for erectile dysfunction and that is working very well for him.    Urinalysis:  Results for orders placed or performed in visit on 02/07/24   POCT URINE DIPSTICK WITH MICROSCOPE, AUTOMATED   Result Value Ref Range    Color, UA Yellow     Spec Grav UA 1.030     pH, UA 5.5     WBC, UA Negative     Nitrite, UA Negative     Protein, POC Negative     Glucose, UA Negative     Ketones, UA Negative     Urobilinogen, UA 0.2     Bilirubin, POC Negative     Blood, UA Negative      Microscopic Urinalysis:  WBC:   None per HPF     RBC:    None per HPF     Bacteria:    None per HPF     Squamous epithelial cells:  None per HPF      Crystals:   None    Lab Results:  PSA History:     Latest Reference Range & Units 10/03/23 06:38 08/19/24 06:08   Prostate Specific Antigen <=4.00 ng/mL 1.00 1.51       Latest Reference Range & Units 10/03/23 06:38 02/05/24 15:28 08/19/24 06:08 02/27/25 15:40   Testosterone Total 240.24 - 870.68 ng/dL 106.62 (L) 448.84 594.93 86.20 (L)      Latest Reference Range & Units 10/03/23 06:38 02/05/24 15:28 08/19/24 06:08 02/27/25 15:40   Estradiol pg/mL 54 24 32 41      Latest Reference Range & Units 02/05/24 15:28 08/19/24 06:08 02/27/25 15:40   Hemoglobin 14.0 - 18.0 g/dL 14.7 15.2 15.2      Latest Reference Range & Units 02/05/24 15:28 08/19/24 06:08 02/27/25 15:40   Hematocrit 42.0 - 52.0 % 44.6 47.3 45.2     ROS:  All systems reviewed and are negative except as documented in HPI and/or Assessment and Plan.     Patient Active Problem List:     Problem List[1]     Past Medical  History:  Past Medical History:   Diagnosis Date    Anemia, unspecified     Bilateral lower extremity edema     Body mass index (BMI) 50.0-59.9, adult     Decreased libido     Essential (primary) hypertension     Gout, unspecified     Male hypogonadism     Morbid (severe) obesity due to excess calories     Personal history of nicotine dependence         Past Surgical History:  Past Surgical History:   Procedure Laterality Date    COSMETIC SURGERY  2000    Mole removal to back        Family History:  Family History   Problem Relation Name Age of Onset    Depression Mother      Anxiety disorder Mother      Glaucoma Father      Hypertension Father      Depression Father          Social History:  Social History     Socioeconomic History    Marital status:    Tobacco Use    Smoking status: Never     Passive exposure: Past    Smokeless tobacco: Former     Types: Snuff     Quit date: 2018   Substance and Sexual Activity    Alcohol use: Yes     Alcohol/week: 6.0 standard drinks of alcohol     Types: 6 Cans of beer per week     Comment: 6pk day    Drug use: Never    Sexual activity: Yes     Partners: Female     Birth control/protection: None        Allergies:  Review of patient's allergies indicates:  No Known Allergies     Objective:  Vitals:    03/05/25 1501   BP: 124/67   Pulse: 69   Resp: 20   Temp: 97.7 °F (36.5 °C)     General:  Well developed, well nourished adult male in no acute distress  Abdomen: Soft, nontender, no masses  Extremities:  No clubbing, cyanosis, or edema  Neurologic:  Grossly intact  Musculoskeletal:  Normal tone    Assessment:  1. Hypogonadism in male  - POCT URINE DIPSTICK WITH MICROSCOPE, AUTOMATED  - testosterone cypionate injection 200 mg  - testosterone cypionate (DEPOTESTOTERONE CYPIONATE) 200 mg/mL injection; Inject 1 mL (200 mg total) into the muscle every 14 (fourteen) days.  Dispense: 2.5 mL; Refill: 5  - Testosterone; Future  - Estradiol; Future  - CBC Auto Differential;  Future    2. High serum estradiol  - anastrozole (ARIMIDEX) 1 mg Tab; Take 1 tablet (1 mg total) by mouth once daily.  Dispense: 90 tablet; Refill: 3    3. Other male erectile dysfunction  - PSA, Total (Diagnostic); Future    Plan:  He was given a testosterone injection today and we will continue to self inject 200 mg every two weeks.  Repeat labs in six months.  He is going to try to feel the anastrozole at the pharmacy here to see if it will be less expensive.  Continue Tadalafil.    Follow-up tests needed:   Labs as listed above in six months.      Return appointment:  Six months with the nurse practitioner with the above labs.                   [1]   Patient Active Problem List  Diagnosis    Vitamin D deficiency    Sleep apnea syndrome    Decreased libido    Male hypogonadism    Morbid (severe) obesity due to excess calories

## 2025-03-05 NOTE — PROGRESS NOTES
Pt  seen by . Testosterone injection given as ordered to right buttock. F/U in six months with MAMADOU Escobedo NP.

## 2025-04-22 ENCOUNTER — HOSPITAL ENCOUNTER (EMERGENCY)
Facility: HOSPITAL | Age: 40
Discharge: HOME | End: 2025-04-22
Attending: EMERGENCY MEDICINE

## 2025-04-22 VITALS
RESPIRATION RATE: 18 BRPM | OXYGEN SATURATION: 97 % | WEIGHT: 315 LBS | HEART RATE: 80 BPM | HEIGHT: 71 IN | SYSTOLIC BLOOD PRESSURE: 121 MMHG | TEMPERATURE: 98 F | DIASTOLIC BLOOD PRESSURE: 79 MMHG | BODY MASS INDEX: 44.1 KG/M2

## 2025-04-22 DIAGNOSIS — M25.561 RIGHT KNEE PAIN, UNSPECIFIED CHRONICITY: Primary | ICD-10-CM

## 2025-04-22 PROCEDURE — 63600175 PHARM REV CODE 636 W HCPCS: Mod: JZ,TB | Performed by: NURSE PRACTITIONER

## 2025-04-22 PROCEDURE — 99284 EMERGENCY DEPT VISIT MOD MDM: CPT | Mod: 25

## 2025-04-22 PROCEDURE — 96372 THER/PROPH/DIAG INJ SC/IM: CPT | Performed by: NURSE PRACTITIONER

## 2025-04-22 RX ORDER — KETOROLAC TROMETHAMINE 30 MG/ML
30 INJECTION, SOLUTION INTRAMUSCULAR; INTRAVENOUS
Status: COMPLETED | OUTPATIENT
Start: 2025-04-22 | End: 2025-04-22

## 2025-04-22 RX ORDER — INDOMETHACIN 25 MG/1
25 CAPSULE ORAL 2 TIMES DAILY PRN
Qty: 14 CAPSULE | Refills: 0 | Status: SHIPPED | OUTPATIENT
Start: 2025-04-22

## 2025-04-22 RX ADMIN — KETOROLAC TROMETHAMINE 30 MG: 30 INJECTION, SOLUTION INTRAMUSCULAR at 09:04

## 2025-04-22 NOTE — Clinical Note
"Homero"Homero" Carrier was seen and treated in our emergency department on 4/22/2025.  He may return to work on 04/23/2025.       If you have any questions or concerns, please don't hesitate to call.       RN    "

## 2025-04-22 NOTE — ED PROVIDER NOTES
Encounter Date: 4/22/2025       History     Chief Complaint   Patient presents with    Knee Pain     To ED with self with c/o right knee pain x 3 days  PT REQUESTING A SHOT FOR PAIN AND NO X RAY     Pt is a 39-year-old male presents for evaluation of his right knee.  Reports pain to joint for the last 3 days.  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  Chronic medical conditions include obesity, gout, anemia, and hypertension.  Patient denies relief with over-the-counter medications.  Denies redness or open wounds to affected joint.  Denies chest pain, shortness of breath, fever, abdominal pain, or loss of bowel or bladder control.      Review of patient's allergies indicates:  No Known Allergies  Past Medical History:   Diagnosis Date    Anemia, unspecified     Bilateral lower extremity edema     Body mass index (BMI) 50.0-59.9, adult     Decreased libido     Essential (primary) hypertension     Gout, unspecified     Male hypogonadism     Morbid (severe) obesity due to excess calories     Personal history of nicotine dependence      Past Surgical History:   Procedure Laterality Date    COSMETIC SURGERY  2000    Mole removal to back     Family History   Problem Relation Name Age of Onset    Depression Mother      Anxiety disorder Mother      Glaucoma Father      Hypertension Father      Depression Father       Social History[1]  Review of Systems   Constitutional:  Negative for chills, diaphoresis, fatigue and fever.   HENT:  Negative for facial swelling, postnasal drip, rhinorrhea, sinus pressure, sinus pain, sore throat and trouble swallowing.    Respiratory:  Negative for cough, chest tightness, shortness of breath and wheezing.    Cardiovascular:  Negative for chest pain, palpitations and leg swelling.   Gastrointestinal:  Negative for abdominal pain, diarrhea, nausea and vomiting.   Genitourinary:  Negative for dysuria, flank pain, hematuria and  urgency.   Musculoskeletal:  Positive for arthralgias. Negative for back pain and myalgias.   Skin:  Negative for color change and rash.   Neurological:  Negative for dizziness, syncope, weakness and headaches.   Hematological:  Does not bruise/bleed easily.   All other systems reviewed and are negative.      Physical Exam     Initial Vitals [04/22/25 0841]   BP Pulse Resp Temp SpO2   127/85 80 18 98.3 °F (36.8 °C) 97 %      MAP       --         Physical Exam    Nursing note and vitals reviewed.  Constitutional: Vital signs are normal. He appears well-developed and well-nourished.   HENT:   Head: Normocephalic.   Nose: Nose normal. Mouth/Throat: Oropharynx is clear and moist.   Eyes: Conjunctivae and EOM are normal. Pupils are equal, round, and reactive to light.   Neck: Neck supple.   Normal range of motion.  Cardiovascular:  Normal rate, regular rhythm, normal heart sounds and intact distal pulses.           Pulmonary/Chest: Effort normal and breath sounds normal. No respiratory distress. He has no wheezes. He has no rhonchi. He has no rales. He exhibits no tenderness.   Abdominal: Abdomen is soft and flat. Bowel sounds are normal. There is no abdominal tenderness. There is no rebound, no guarding, no tenderness at McBurney's point and negative Burch's sign.   Musculoskeletal:         General: Normal range of motion.      Cervical back: Normal range of motion and neck supple.      Right knee: Swelling present. No ecchymosis. Normal range of motion. Tenderness present. Normal pulse.        Legs:      Neurological: He is alert and oriented to person, place, and time. He has normal strength.   Skin: Skin is warm and dry. Capillary refill takes less than 2 seconds.   Psychiatric: He has a normal mood and affect. His behavior is normal. Judgment and thought content normal.         ED Course   Procedures  Labs Reviewed - No data to display       Imaging Results    None          Medications   ketorolac injection 30 mg  (has no administration in time range)     Medical Decision Making  Differential:   Gout   Arthralgia   Muscle strain    Risk  Prescription drug management.               ED Course as of 04/22/25 0905   Tue Apr 22, 2025   0904 Given strict ED return precautions. I have spoken with the patient and/or caregivers. I have explained the patient's condition, diagnoses and treatment plan based on the information available to me at this time. I have answered the patient's and/or caregiver's questions and addressed any concerns. The patient and/or caregivers have as good an understanding of the patient's diagnosis, condition and treatment plan as can be expected at this point. The vital signs have been stable. The patient's condition is stable and appropriate for discharge from the emergency department.      The patient will pursue further outpatient evaluation with the primary care physician or other designated or consulting physician as outlined in the discharge instructions. The patient and/or caregivers are agreeable to this plan of care and follow-up instructions have been explained in detail. The patient and/or caregivers have received these instructions in written format and have expressed an understanding of the discharge instructions. The patient and/or caregivers are aware that any significant change in condition or worsening of symptoms should prompt an immediate return to this or the closest emergency department or a call to 911.   [JA]      ED Course User Index  [JA] Vasile Rodriguez Jr., P                       This chart is generated using a voice recognition system. Grammatical and content areas may inadvertently be generated in error. Please contact me if you find a perceive any inappropriate information in this chart. Thank you.       Clinical Impression:  Final diagnoses:  [M25.561] Right knee pain, unspecified chronicity (Primary)          ED Disposition Condition    Discharge Stable          ED  Prescriptions       Medication Sig Dispense Start Date End Date Auth. Provider    indomethacin (INDOCIN) 25 MG capsule Take 1 capsule (25 mg total) by mouth 2 (two) times daily as needed (pain). 14 capsule 4/22/2025 -- Vasile Rodriguez Jr., AYANA          Follow-up Information       Follow up With Specialties Details Why Contact Info    Nahomy Lee, AYANA Family Medicine In 3 days  [address]  [address]  [facility] [address] [phone]      [facility] Emergency Medicine In 3 days As needed, If symptoms worsen [address] [address] [phone]               [1]   Social History  Tobacco Use    Smoking status: Never     Passive exposure: Past    Smokeless tobacco: Former     Types: Snuff     Quit date: 2018   Substance Use Topics    Alcohol use: Yes     Alcohol/week: 6.0 standard drinks of alcohol     Types: 6 Cans of beer per week     Comment: 6pk day    Drug use: Never        Vasile Rodriguez Jr., FNP  04/22/25 0905

## 2025-06-30 ENCOUNTER — HOSPITAL ENCOUNTER (EMERGENCY)
Facility: HOSPITAL | Age: 40
Discharge: HOME | End: 2025-06-30
Attending: FAMILY MEDICINE

## 2025-06-30 VITALS
OXYGEN SATURATION: 97 % | DIASTOLIC BLOOD PRESSURE: 83 MMHG | HEART RATE: 84 BPM | HEIGHT: 71 IN | SYSTOLIC BLOOD PRESSURE: 155 MMHG | TEMPERATURE: 98 F | WEIGHT: 315 LBS | BODY MASS INDEX: 44.1 KG/M2 | RESPIRATION RATE: 32 BRPM

## 2025-06-30 DIAGNOSIS — Z76.89 ESTABLISHING CARE WITH NEW DOCTOR, ENCOUNTER FOR: Primary | ICD-10-CM

## 2025-06-30 DIAGNOSIS — I10 PRIMARY HYPERTENSION: Primary | ICD-10-CM

## 2025-06-30 DIAGNOSIS — I10 HYPERTENSION, UNSPECIFIED TYPE: ICD-10-CM

## 2025-06-30 DIAGNOSIS — R07.9 CHEST PAIN: ICD-10-CM

## 2025-06-30 DIAGNOSIS — I10 HTN (HYPERTENSION): ICD-10-CM

## 2025-06-30 LAB
ANION GAP SERPL CALC-SCNC: 7 MEQ/L
BASOPHILS # BLD AUTO: 0.07 X10(3)/MCL
BASOPHILS NFR BLD AUTO: 0.9 %
BUN SERPL-MCNC: 14.3 MG/DL (ref 8.9–20.6)
CALCIUM SERPL-MCNC: 9.2 MG/DL (ref 8.4–10.2)
CHLORIDE SERPL-SCNC: 106 MMOL/L (ref 98–107)
CO2 SERPL-SCNC: 27 MMOL/L (ref 22–29)
CREAT SERPL-MCNC: 0.84 MG/DL (ref 0.72–1.25)
CREAT/UREA NIT SERPL: 17
EOSINOPHIL # BLD AUTO: 0.23 X10(3)/MCL (ref 0–0.9)
EOSINOPHIL NFR BLD AUTO: 2.9 %
ERYTHROCYTE [DISTWIDTH] IN BLOOD BY AUTOMATED COUNT: 13.1 % (ref 11.5–17)
GFR SERPLBLD CREATININE-BSD FMLA CKD-EPI: >60 ML/MIN/1.73/M2
GLUCOSE SERPL-MCNC: 115 MG/DL (ref 74–100)
HCT VFR BLD AUTO: 45.6 % (ref 42–52)
HGB BLD-MCNC: 15.5 G/DL (ref 14–18)
HOLD SPECIMEN: NORMAL
IMM GRANULOCYTES # BLD AUTO: 0.04 X10(3)/MCL (ref 0–0.04)
IMM GRANULOCYTES NFR BLD AUTO: 0.5 %
INR PPP: 1
LYMPHOCYTES # BLD AUTO: 2.43 X10(3)/MCL (ref 0.6–4.6)
LYMPHOCYTES NFR BLD AUTO: 30.2 %
MAGNESIUM SERPL-MCNC: 1.9 MG/DL (ref 1.6–2.6)
MCH RBC QN AUTO: 32.5 PG (ref 27–31)
MCHC RBC AUTO-ENTMCNC: 34 G/DL (ref 33–36)
MCV RBC AUTO: 95.6 FL (ref 80–94)
MONOCYTES # BLD AUTO: 0.6 X10(3)/MCL (ref 0.1–1.3)
MONOCYTES NFR BLD AUTO: 7.5 %
NEUTROPHILS # BLD AUTO: 4.67 X10(3)/MCL (ref 2.1–9.2)
NEUTROPHILS NFR BLD AUTO: 58 %
NRBC BLD AUTO-RTO: 0 %
OHS QRS DURATION: 82 MS
OHS QTC CALCULATION: 385 MS
PLATELET # BLD AUTO: 256 X10(3)/MCL (ref 130–400)
PMV BLD AUTO: 10.5 FL (ref 7.4–10.4)
POTASSIUM SERPL-SCNC: 4.4 MMOL/L (ref 3.5–5.1)
PROTHROMBIN TIME: 13.6 SECONDS (ref 11.4–14)
RBC # BLD AUTO: 4.77 X10(6)/MCL (ref 4.7–6.1)
SODIUM SERPL-SCNC: 140 MMOL/L (ref 136–145)
TROPONIN I SERPL-MCNC: <0.01 NG/ML (ref 0–0.04)
TSH SERPL-ACNC: 1.79 UIU/ML (ref 0.35–4.94)
WBC # BLD AUTO: 8.04 X10(3)/MCL (ref 4.5–11.5)

## 2025-06-30 PROCEDURE — 80048 BASIC METABOLIC PNL TOTAL CA: CPT | Performed by: FAMILY MEDICINE

## 2025-06-30 PROCEDURE — 25000003 PHARM REV CODE 250: Performed by: FAMILY MEDICINE

## 2025-06-30 PROCEDURE — 85610 PROTHROMBIN TIME: CPT | Performed by: FAMILY MEDICINE

## 2025-06-30 PROCEDURE — 99285 EMERGENCY DEPT VISIT HI MDM: CPT | Mod: 25

## 2025-06-30 PROCEDURE — 84484 ASSAY OF TROPONIN QUANT: CPT | Performed by: FAMILY MEDICINE

## 2025-06-30 PROCEDURE — 93005 ELECTROCARDIOGRAM TRACING: CPT

## 2025-06-30 PROCEDURE — 83735 ASSAY OF MAGNESIUM: CPT | Performed by: FAMILY MEDICINE

## 2025-06-30 PROCEDURE — 84443 ASSAY THYROID STIM HORMONE: CPT | Performed by: FAMILY MEDICINE

## 2025-06-30 PROCEDURE — 85025 COMPLETE CBC W/AUTO DIFF WBC: CPT | Performed by: FAMILY MEDICINE

## 2025-06-30 RX ORDER — LISINOPRIL AND HYDROCHLOROTHIAZIDE 20; 25 MG/1; MG/1
1 TABLET ORAL DAILY
Qty: 30 TABLET | Refills: 0 | Status: SHIPPED | OUTPATIENT
Start: 2025-06-30

## 2025-06-30 RX ORDER — AMLODIPINE BESYLATE 5 MG/1
5 TABLET ORAL
Status: COMPLETED | OUTPATIENT
Start: 2025-06-30 | End: 2025-06-30

## 2025-06-30 RX ADMIN — AMLODIPINE BESYLATE 5 MG: 5 TABLET ORAL at 09:06

## 2025-06-30 NOTE — ED PROVIDER NOTES
"Encounter Date: 6/30/2025       History     Chief Complaint   Patient presents with    Dizziness     Reports dizziness for the past month, "I think my blood pressure is high but I don't like taking medicine". Reports midline chest pressure over the past month as well.      39-year-old presents with a history of hypertension has not been on his meds for a couple years said he did not like the way they made him feel has been little dizzy lightheaded blood pressure has been elevated lately did have some weight loss but continues to have hypertension just here today to get started back on his meds said he tried to get appointment in clinic but he could not see him today no chest pain no shortness a breath no headaches        Review of patient's allergies indicates:  No Known Allergies  Past Medical History:   Diagnosis Date    Anemia, unspecified     Bilateral lower extremity edema     Body mass index (BMI) 50.0-59.9, adult     Decreased libido     Essential (primary) hypertension     Gout, unspecified     Male hypogonadism     Morbid (severe) obesity due to excess calories     Personal history of nicotine dependence      Past Surgical History:   Procedure Laterality Date    COSMETIC SURGERY  2000    Mole removal to back     Family History   Problem Relation Name Age of Onset    Depression Mother      Anxiety disorder Mother      Glaucoma Father      Hypertension Father      Depression Father       Social History[1]  Review of Systems   Constitutional:  Negative for fever.   HENT:  Negative for sore throat.    Respiratory:  Negative for shortness of breath.    Cardiovascular:  Negative for chest pain.   Gastrointestinal:  Negative for nausea.   Genitourinary:  Negative for dysuria.   Musculoskeletal:  Negative for back pain.   Skin:  Negative for rash.   Neurological:  Positive for dizziness. Negative for weakness.   Hematological:  Does not bruise/bleed easily.   All other systems reviewed and are " negative.      Physical Exam     Initial Vitals [06/30/25 0918]   BP Pulse Resp Temp SpO2   (!) 183/129 82 20 97.9 °F (36.6 °C) 97 %      MAP       --         Physical Exam    Nursing note and vitals reviewed.  Constitutional: He appears well-developed and well-nourished. He is active.   HENT:   Head: Normocephalic and atraumatic.   Eyes: Conjunctivae, EOM and lids are normal. Pupils are equal, round, and reactive to light.   Neck: Trachea normal and phonation normal. Neck supple. No thyroid mass present.   Normal range of motion.  Cardiovascular:  Normal rate, regular rhythm, normal heart sounds and normal pulses.           Pulmonary/Chest: Breath sounds normal.   Abdominal: Abdomen is soft. Bowel sounds are normal.   Musculoskeletal:         General: Normal range of motion.      Cervical back: Normal range of motion and neck supple.     Neurological: He is alert and oriented to person, place, and time. He has normal strength and normal reflexes.   Skin: Skin is warm and intact.   Psychiatric: He has a normal mood and affect. His speech is normal and behavior is normal. Judgment and thought content normal. Cognition and memory are normal.         ED Course   Procedures  Labs Reviewed   BASIC METABOLIC PANEL - Abnormal       Result Value    Sodium 140      Potassium 4.4      Chloride 106      CO2 27      Glucose 115 (*)     Blood Urea Nitrogen 14.3      Creatinine 0.84      BUN/Creatinine Ratio 17      Calcium 9.2      Anion Gap 7.0      eGFR >60     CBC WITH DIFFERENTIAL - Abnormal    WBC 8.04      RBC 4.77      Hgb 15.5      Hct 45.6      MCV 95.6 (*)     MCH 32.5 (*)     MCHC 34.0      RDW 13.1      Platelet 256      MPV 10.5 (*)     Neut % 58.0      Lymph % 30.2      Mono % 7.5      Eos % 2.9      Basophil % 0.9      Imm Grans % 0.5      Neut # 4.67      Lymph # 2.43      Mono # 0.60      Eos # 0.23      Baso # 0.07      Imm Gran # 0.04      NRBC% 0.0     TSH - Normal    TSH 1.793     TROPONIN I - Normal     Troponin-I <0.010     PROTIME-INR - Normal    PT 13.6      INR 1.0      Narrative:     Protimes are used to monitor anticoagulant agents such as warfarin. PT INR values are based on the current patient normal mean and the LAI value for the specific instrument reagent used.  **Routine theraputic target values for the INR are 2.0-3.0**   MAGNESIUM - Normal    Magnesium Level 1.90     CBC W/ AUTO DIFFERENTIAL    Narrative:     The following orders were created for panel order CBC Auto Differential.  Procedure                               Abnormality         Status                     ---------                               -----------         ------                     CBC with Differential[[id number]]       Abnormal            Final result                 Please view results for these tests on the individual orders.   EXTRA TUBES    Narrative:     The following orders were created for panel order EXTRA TUBES.  Procedure                               Abnormality         Status                     ---------                               -----------         ------                     Gold Top Hold[[id number]]                                   Final result                 Please view results for these tests on the individual orders.   GOLD TOP HOLD    Extra Tube Hold for add-ons.       EKG Readings: (Independently Interpreted)   Initial Reading: No STEMI. Rhythm: Normal Sinus Rhythm. Heart Rate: 84. Ectopy: No Ectopy. Conduction: Normal. ST Segments: Normal ST Segments. T Waves: Normal. Clinical Impression: Normal Sinus Rhythm     ECG Results              EKG 12-lead (Chest Pain) Age >30 (In process)        Collection Time Result Time QRS Duration OHS QTC Calculation    06/30/25 09:25:27 06/30/25 09:47:22 82 385                     In process by Interface, Lab In [facility] (06/30/25 09:47:30)                   Narrative:    Test Reason : R07.9,    Vent. Rate :  84 BPM     Atrial Rate :  84 BPM     P-R Int : 132 ms           QRS Dur :  82 ms      QT Int : 326 ms       P-R-T Axes :  15 133 -20 degrees    QTcB Int : 385 ms    Normal sinus rhythm  Left posterior fascicular block  Inferior infarct ,age undetermined  Abnormal ECG  No previous ECGs available    Referred By: AAAREFERRAL SELF           Confirmed By:                                   Imaging Results              X-Ray Chest PA And Lateral (Final result)  Result time 06/30/25 10:30:52      Final result by Shashank Thomas MD (06/30/25 10:30:52)                   Impression:      No radiographic evidence of an acute cardiopulmonary process.      Electronically signed by: Shashank Thomas  Date:    06/30/2025  Time:    10:30               Narrative:    EXAMINATION:  XR CHEST PA AND LATERAL    CLINICAL HISTORY:  Essential (primary) hypertension    COMPARISON:  08/28/2018    FINDINGS:  PA and lateral chest radiographs are provided for evaluation.    Cardiac silhouette is normal in size.    No focal consolidation, pneumothorax, or pleural effusion.    No acute osseous findings.                                       Medications   amLODIPine tablet 5 mg (5 mg Oral Given 6/30/25 0958)     Medical Decision Making  39-year-old presents with a history of hypertension has not been on his meds for a couple years said he did not like the way they made him feel has been little dizzy lightheaded blood pressure has been elevated lately did have some weight loss but continues to have hypertension just here today to get started back on his meds said he tried to get appointment in clinic but he could not see him today no chest pain no shortness a breath no headaches          Amount and/or Complexity of Data Reviewed  Labs: ordered. Decision-making details documented in ED Course.  Radiology: ordered and independent interpretation performed.  ECG/medicine tests: ordered and independent interpretation performed.    Risk  Prescription drug management.  Risk Details: Differential diagnosis primary  hypertension noncompliant with meds               ED Course as of 06/30/25 1418   Mon Jun 30, 2025   1027 Magnesium : 1.90 [BL]      ED Course User Index  [BL] Chad Edwards MD                           Clinical Impression:  Final diagnoses:  [R07.9] Chest pain  [I10] HTN (hypertension)  [I10] Primary hypertension (Primary)          ED Disposition Condition    Discharge Stable          ED Prescriptions       Medication Sig Dispense Start Date End Date Auth. Provider    lisinopriL-hydrochlorothiazide (PRINZIDE,ZESTORETIC) 20-25 mg Tab Take 1 tablet by mouth once daily. 30 tablet 6/30/2025 -- Chad Edwards MD          Follow-up Information       Follow up With Specialties Details Why Contact Info    Nahomy Lee, [facility] In 1 week  [address] [facility] [address]  [phone]                   Chad Edwards MD  06/30/25 1053         [1]   Social History  Tobacco Use    Smoking status: Never     Passive exposure: Past    Smokeless tobacco: Former     Types: Snuff     Quit date: 2018   Substance Use Topics    Alcohol use: Yes     Alcohol/week: 6.0 standard drinks of alcohol     Types: 6 Cans of beer per week     Comment: 6pk day    Drug use: Never        Chad Edwards MD  06/30/25 1418

## 2025-06-30 NOTE — Clinical Note
"Homero"Homero" Carrier was seen and treated in our emergency department on 6/30/2025.  He may return to work on 07/01/2025.       If you have any questions or concerns, please don't hesitate to call.      Nichole AUGUSTIN RN    "

## 2025-07-23 ENCOUNTER — OFFICE VISIT (OUTPATIENT)
Dept: INTERNAL MEDICINE | Facility: CLINIC | Age: 40
End: 2025-07-23

## 2025-07-23 VITALS
DIASTOLIC BLOOD PRESSURE: 73 MMHG | WEIGHT: 315 LBS | HEIGHT: 71 IN | SYSTOLIC BLOOD PRESSURE: 118 MMHG | HEART RATE: 93 BPM | BODY MASS INDEX: 44.1 KG/M2 | OXYGEN SATURATION: 96 % | TEMPERATURE: 99 F | RESPIRATION RATE: 18 BRPM

## 2025-07-23 DIAGNOSIS — I44.5 LEFT POSTERIOR FASCICULAR BLOCK (LPFB): ICD-10-CM

## 2025-07-23 DIAGNOSIS — G47.33 OBSTRUCTIVE SLEEP APNEA SYNDROME: ICD-10-CM

## 2025-07-23 DIAGNOSIS — R79.89 HIGH SERUM ESTRADIOL: ICD-10-CM

## 2025-07-23 DIAGNOSIS — Z00.00 WELLNESS EXAMINATION: Primary | ICD-10-CM

## 2025-07-23 DIAGNOSIS — Z76.89 ESTABLISHING CARE WITH NEW DOCTOR, ENCOUNTER FOR: ICD-10-CM

## 2025-07-23 DIAGNOSIS — I10 HYPERTENSION, UNSPECIFIED TYPE: ICD-10-CM

## 2025-07-23 DIAGNOSIS — E29.1 MALE HYPOGONADISM: ICD-10-CM

## 2025-07-23 PROCEDURE — 90471 IMMUNIZATION ADMIN: CPT | Mod: PBBFAC

## 2025-07-23 PROCEDURE — 99214 OFFICE O/P EST MOD 30 MIN: CPT | Mod: PBBFAC | Performed by: FAMILY MEDICINE

## 2025-07-23 PROCEDURE — 90714 TD VACC NO PRESV 7 YRS+ IM: CPT | Mod: PBBFAC

## 2025-07-23 RX ORDER — ANASTROZOLE 1 MG/1
1 TABLET ORAL DAILY
Qty: 90 TABLET | Refills: 3 | Status: SHIPPED | OUTPATIENT
Start: 2025-07-23

## 2025-07-23 RX ORDER — LISINOPRIL AND HYDROCHLOROTHIAZIDE 20; 25 MG/1; MG/1
1 TABLET ORAL DAILY
Qty: 90 TABLET | Refills: 3 | Status: SHIPPED | OUTPATIENT
Start: 2025-07-23 | End: 2025-10-21

## 2025-07-23 RX ADMIN — CLOSTRIDIUM TETANI TOXOID ANTIGEN (FORMALDEHYDE INACTIVATED) AND CORYNEBACTERIUM DIPHTHERIAE TOXOID ANTIGEN (FORMALDEHYDE INACTIVATED) 0.5 ML: 5; 2 INJECTION, SUSPENSION INTRAMUSCULAR at 01:07

## 2025-07-23 NOTE — LETTER
July 23, 2025      [facility]  [facility] [facility] [address]  Phone: [phone]  Fax: [phone]       Patient: Homero Crocker   YOB: 1985  Date of Visit: 07/23/2025    To Whom It May Concern:    Jazmyn Crocker  was at [facility] on 07/23/2025. He may return to work/school on 07/24/2025 with no restrictions. If you have any questions or concerns, or if I can be of further assistance, please do not hesitate to contact me.    Sincerely,      AYANA Walker

## 2025-07-23 NOTE — PROGRESS NOTES
- lost 80 lbs within last year  - hospital visit on 25 - abnl EKG - cardio referral  - cbc, cmp, flp, A1c, vit d3, vit b12.  - seeing urology for testosterone/anastrozole  n since getting older. Denies SOB, dizziness, weakness.  - only when BP is high, he feels weak and lightheaded.     BEATRIZ PRYOR, AYANA   [facility]  [address] [address]      PATIENT NAME: Homero Crocker  : 1985  DATE: 25  MRN: [id number]      Reason for Visit / Chief Complaint: Establish Care (Seen in ED  chest pain and heart skipping a beat. ) and Hypertension       History of Present Illness / Problem Focused Workflow        Mr. Homero Crocker is a 40 y/o  male who presents to clinic to establish care. Former pt of Nahomy Lee NP ([facility]). Has HTN and hypogonadism. Followed by Urology for hypogonadism; on testosterone injections in combination with Anastrozole, doing well on regimen. C/o heart skipping a beat since approx the age of 16; reports that it happens randomly. Has noticed an increase in frequency as he has gotten older. Denies SOB, dizziness, and weakness when episodes occur. Has lost 80 lbs within last year, with keto diet.    Was seen in the ER on 25 for elevated B/P and chest pain. Was started back on Lisinopril/HCTZ like previously prescribed. EKG displayed left posterior fascicular block; inferior infarct, age undetermined. B/P stable today in clinic, 118/73. Will refer to Cardio for further assessment/evaluation.    Will order CBC, CMP, FLP, TSH, free T4, Vit D3, Vit B12, and HgbA1C.       Review of Systems     Review of Systems   All other systems reviewed and are negative.        Medications and Allergies     Medications  Medication List with Changes/Refills   Current Medications    TADALAFIL (CIALIS) 20 MG TAB    Take 1 tablet (20 mg total) by mouth daily as needed (prior to sexual activity).     TESTOSTERONE CYPIONATE (DEPOTESTOTERONE CYPIONATE) 200 MG/ML INJECTION    Inject 1 mL (200 mg total) into the muscle every 14 (fourteen) days.   Changed and/or Refilled Medications    Modified Medication Previous Medication    ANASTROZOLE (ARIMIDEX) 1 MG TAB anastrozole (ARIMIDEX) 1 mg Tab       Take 1 tablet (1 mg total) by mouth once daily.    Take 1 tablet (1 mg total) by mouth once daily.    LISINOPRIL-HYDROCHLOROTHIAZIDE (PRINZIDE,ZESTORETIC) 20-25 MG TAB lisinopriL-hydrochlorothiazide (PRINZIDE,ZESTORETIC) 20-25 mg Tab       Take 1 tablet by mouth once daily.    Take 1 tablet by mouth once daily.   Discontinued Medications    ESCITALOPRAM OXALATE (LEXAPRO) 20 MG TABLET    Take 1 tablet (20 mg total) by mouth once daily. Appointment visit required for further refills    INDOMETHACIN (INDOCIN) 25 MG CAPSULE    Take 1 capsule (25 mg total) by mouth 2 (two) times daily as needed (pain).    TOBRAMYCIN SULFATE 0.3% (TOBREX) 0.3 % OPHTHALMIC SOLUTION    Place 1 drop into the right eye every 4 (four) hours.    TRAZODONE (DESYREL) 150 MG TABLET    TAKE ONE TABLET BY MOUTH NIGHTLY         Allergies  Review of patient's allergies indicates:  No Known Allergies    Physical Examination     Vitals:    07/23/25 1209   BP: 118/73   Pulse: 93   Resp: 18   Temp: 98.6 °F (37 °C)     Physical Exam  Vitals and nursing note reviewed.   Constitutional:       Appearance: Normal appearance.   HENT:      Head: Normocephalic and atraumatic.   Eyes:      Extraocular Movements: Extraocular movements intact.      Pupils: Pupils are equal, round, and reactive to light.   Cardiovascular:      Rate and Rhythm: Normal rate and regular rhythm.      Pulses: Normal pulses.           Posterior tibial pulses are 2+ on the right side and 2+ on the left side.      Heart sounds: Normal heart sounds, S1 normal and S2 normal. No murmur heard.  Pulmonary:      Effort: Pulmonary effort is normal. No accessory muscle usage or respiratory distress.       Breath sounds: Normal breath sounds and air entry.   Musculoskeletal:         General: Normal range of motion.      Right lower leg: No edema.      Left lower leg: No edema.   Skin:     General: Skin is warm and dry.      Capillary Refill: Capillary refill takes less than 2 seconds.   Neurological:      General: No focal deficit present.      Mental Status: He is alert and oriented to person, place, and time.      Cranial Nerves: Cranial nerves 2-12 are intact.      Sensory: Sensation is intact.      Motor: Motor function is intact. No weakness or tremor.      Coordination: Coordination is intact.      Gait: Gait is intact.   Psychiatric:         Attention and Perception: Attention and perception normal.         Mood and Affect: Mood and affect normal.         Speech: Speech normal.         Behavior: Behavior normal. Behavior is cooperative.         Thought Content: Thought content normal.         Cognition and Memory: Cognition and memory normal.         Judgment: Judgment normal.       Results     Lab Results   Component Value Date    WBC 8.04 06/30/2025    RBC 4.77 06/30/2025    HGB 15.5 06/30/2025    HCT 45.6 06/30/2025    MCV 95.6 (H) 06/30/2025    MCH 32.5 (H) 06/30/2025    MCHC 34.0 06/30/2025    RDW 13.1 06/30/2025     06/30/2025    MPV 10.5 (H) 06/30/2025     Sodium   Date Value Ref Range Status   06/30/2025 140 136 - 145 mmol/L Final     Potassium   Date Value Ref Range Status   06/30/2025 4.4 3.5 - 5.1 mmol/L Final     Chloride   Date Value Ref Range Status   06/30/2025 106 98 - 107 mmol/L Final     CO2   Date Value Ref Range Status   06/30/2025 27 22 - 29 mmol/L Final     Glucose   Date Value Ref Range Status   06/30/2025 115 (H) 74 - 100 mg/dL Final     Blood Urea Nitrogen   Date Value Ref Range Status   06/30/2025 14.3 8.9 - 20.6 mg/dL Final     Creatinine   Date Value Ref Range Status   06/30/2025 0.84 0.72 - 1.25 mg/dL Final     Calcium   Date Value Ref Range Status   06/30/2025 9.2 8.4 - 10.2  "mg/dL Final     Protein Total   Date Value Ref Range Status   10/31/2022 7.0 6.4 - 8.3 gm/dL Final     Albumin   Date Value Ref Range Status   10/31/2022 3.6 3.5 - 5.0 gm/dL Final     Bilirubin Total   Date Value Ref Range Status   10/31/2022 0.4 <=1.5 mg/dL Final     ALP   Date Value Ref Range Status   10/31/2022 59 40 - 150 unit/L Final     AST   Date Value Ref Range Status   10/31/2022 24 5 - 34 unit/L Final     ALT   Date Value Ref Range Status   10/31/2022 43 0 - 55 unit/L Final     Estimated GFR-Non    Date Value Ref Range Status   08/08/2020 >60 mL/min/1.73 m2 Final     Lab Results   Component Value Date    CHOL 159 10/31/2022     Lab Results   Component Value Date    HDL 45 10/31/2022     Lab Results   Component Value Date    TRIG 128 10/31/2022     Lab Results   Component Value Date    VLDL 26 10/31/2022     No components found for: "LDL"    Lab Results   Component Value Date    TSH 1.793 06/30/2025     Lab Results   Component Value Date    PHUR 5.5 02/07/2024    SPECGRAV 1.030 02/07/2024    PROTEINUA Negative 05/22/2017    GLUCUA Negative 05/22/2017    KETONESU Negative 02/07/2024    OCCULTUA Negative 05/22/2017    NITRITE Negative 02/07/2024    LEUKOCYTESUR Negative 05/22/2017     Lab Results   Component Value Date    HGBA1C 5.5 10/31/2022       Assessment         ICD-10-CM ICD-9-CM   1. Wellness examination  Z00.00 V70.0   2. Hypertension, unspecified type  I10 401.9   3. Left posterior fascicular block (LPFB)  I44.5 426.2   4. BMI 45.0-49.9, adult  Z68.42 V85.42   5. Obstructive sleep apnea syndrome  G47.33 327.23   6. Male hypogonadism  E29.1 257.2   7. High serum estradiol  R79.89 790.99   8. Establishing care with new doctor, encounter for  Z76.89 V65.8       Plan      1. Wellness examination  -     CBC Auto Differential; Future; Expected date: 07/23/2025  -     Comprehensive Metabolic Panel; Future; Expected date: 07/23/2025  -     Lipid Panel; Future; Expected date: 07/23/2025  -    " " TSH; Future; Expected date: 07/23/2025  -     Hemoglobin A1C; Future; Expected date: 07/23/2025  -     Urinalysis; Future; Expected date: 07/23/2025  -     Vitamin D; Future; Expected date: 07/23/2025  -     T4, Free; Future; Expected date: 07/23/2025  -     Td (Tenivac) IM vaccine (>/= 8 yo)  -     Hepatitis C Antibody; Future; Expected date: 07/23/2025  -     HIV 1/2 Ag/Ab (4th Gen); Future; Expected date: 07/23/2025    2. Hypertension, unspecified type  Assessment & Plan:  controlled  Hypertension Medications              lisinopriL-hydrochlorothiazide (PRINZIDE,ZESTORETIC) 20-25 mg Tab Take 1 tablet by mouth once daily.        - Continue Lisinopril/HCTZ 20/25 mg QD.  - Follow low sodium diet (DASH Diet - Less than 2 grams of sodium per day).  - Monitor blood pressure daily and log. Report consistent numbers greater than 140/90.  - Maintain healthy weight with goal BMI <35. Exercise 30 minutes per day, with weight training, 4-5 days per week, or as tolerated.    Orders:  -     lisinopriL-hydrochlorothiazide (PRINZIDE,ZESTORETIC) 20-25 mg Tab; Take 1 tablet by mouth once daily.  Dispense: 90 tablet; Refill: 3    3. Left posterior fascicular block (LPFB)  Assessment & Plan:  - Confirmed on recent EKG in ER on 6/30/25.  - Refer to Cardio for further assessment/evaluation.     Orders:  -     Ambulatory referral/consult to Cardiology; Future; Expected date: 07/30/2025    4. BMI 45.0-49.9, adult    5. Obstructive sleep apnea syndrome  Assessment & Plan:  - Has CHARO, but unable to tolerate CPAP mask.   - Reports "sleeping well."      6. Male hypogonadism  Assessment & Plan:  - Followed by Urology.   - Continue Testosterone injections and Anastrozole QD regimen as instructed.      7. High serum estradiol  -     anastrozole (ARIMIDEX) 1 mg Tab; Take 1 tablet (1 mg total) by mouth once daily.  Dispense: 90 tablet; Refill: 3    8. Establishing care with new doctor, encounter for  -     Ambulatory referral/consult to " Internal Medicine         Future Appointments   Date Time Provider Department Center   8/11/2025  2:00 PM Fabiola Jefferson FNP [facility]   9/5/2025 12:30 PM Olegario Escobedo NP [facility]        Signature:       SAVITA Chauhan    [facility]  [address] [facility] [address]    Date of encounter: 7/23/25

## 2025-07-25 PROBLEM — I10 HYPERTENSION: Status: ACTIVE | Noted: 2025-07-25

## 2025-07-25 PROBLEM — I44.5: Status: ACTIVE | Noted: 2025-07-25

## 2025-07-26 NOTE — ASSESSMENT & PLAN NOTE
controlled  Hypertension Medications              lisinopriL-hydrochlorothiazide (PRINZIDE,ZESTORETIC) 20-25 mg Tab Take 1 tablet by mouth once daily.        - Continue Lisinopril/HCTZ 20/25 mg QD.  - Follow low sodium diet (DASH Diet - Less than 2 grams of sodium per day).  - Monitor blood pressure daily and log. Report consistent numbers greater than 140/90.  - Maintain healthy weight with goal BMI <35. Exercise 30 minutes per day, with weight training, 4-5 days per week, or as tolerated.

## 2025-07-26 NOTE — ASSESSMENT & PLAN NOTE
- Confirmed on recent EKG in ER on 6/30/25.  - Refer to Cardio for further assessment/evaluation.

## 2025-07-26 NOTE — ASSESSMENT & PLAN NOTE
- Followed by Urology.   - Continue Testosterone injections and Anastrozole QD regimen as instructed.

## 2025-07-30 ENCOUNTER — LAB VISIT (OUTPATIENT)
Dept: LAB | Facility: HOSPITAL | Age: 40
End: 2025-07-30
Attending: FAMILY MEDICINE

## 2025-07-30 DIAGNOSIS — N52.8 OTHER MALE ERECTILE DYSFUNCTION: ICD-10-CM

## 2025-07-30 DIAGNOSIS — Z00.00 WELLNESS EXAMINATION: ICD-10-CM

## 2025-07-30 DIAGNOSIS — E29.1 HYPOGONADISM IN MALE: ICD-10-CM

## 2025-07-30 LAB
25(OH)D3+25(OH)D2 SERPL-MCNC: 39 NG/ML (ref 30–80)
ALBUMIN SERPL-MCNC: 3.9 G/DL (ref 3.5–5)
ALBUMIN/GLOB SERPL: 0.9 RATIO (ref 1.1–2)
ALP SERPL-CCNC: 58 UNIT/L (ref 40–150)
ALT SERPL-CCNC: 22 UNIT/L (ref 0–55)
ANION GAP SERPL CALC-SCNC: 9 MEQ/L
AST SERPL-CCNC: 17 UNIT/L (ref 11–45)
BASOPHILS # BLD AUTO: 0.07 X10(3)/MCL
BASOPHILS NFR BLD AUTO: 1.1 %
BILIRUB SERPL-MCNC: 0.6 MG/DL
BUN SERPL-MCNC: 21.5 MG/DL (ref 8.9–20.6)
CALCIUM SERPL-MCNC: 9.5 MG/DL (ref 8.4–10.2)
CHLORIDE SERPL-SCNC: 101 MMOL/L (ref 98–107)
CHOLEST SERPL-MCNC: 179 MG/DL
CHOLEST/HDLC SERPL: 4 {RATIO} (ref 0–5)
CO2 SERPL-SCNC: 26 MMOL/L (ref 22–29)
CREAT SERPL-MCNC: 1 MG/DL (ref 0.72–1.25)
CREAT/UREA NIT SERPL: 22
EOSINOPHIL # BLD AUTO: 0.23 X10(3)/MCL (ref 0–0.9)
EOSINOPHIL NFR BLD AUTO: 3.5 %
ERYTHROCYTE [DISTWIDTH] IN BLOOD BY AUTOMATED COUNT: 12.9 % (ref 11.5–17)
EST. AVERAGE GLUCOSE BLD GHB EST-MCNC: 108.3 MG/DL
ESTRADIOL SERPL HS-MCNC: <24 PG/ML
GFR SERPLBLD CREATININE-BSD FMLA CKD-EPI: >60 ML/MIN/1.73/M2
GLOBULIN SER-MCNC: 4.5 GM/DL (ref 2.4–3.5)
GLUCOSE SERPL-MCNC: 101 MG/DL (ref 74–100)
HBA1C MFR BLD: 5.4 %
HCT VFR BLD AUTO: 45.1 % (ref 42–52)
HCV AB SERPL QL IA: NONREACTIVE
HDLC SERPL-MCNC: 47 MG/DL (ref 35–60)
HGB BLD-MCNC: 15.7 G/DL (ref 14–18)
HIV 1+2 AB+HIV1 P24 AG SERPL QL IA: NONREACTIVE
IMM GRANULOCYTES # BLD AUTO: 0.02 X10(3)/MCL (ref 0–0.04)
IMM GRANULOCYTES NFR BLD AUTO: 0.3 %
LDLC SERPL CALC-MCNC: 109 MG/DL (ref 50–140)
LYMPHOCYTES # BLD AUTO: 2.28 X10(3)/MCL (ref 0.6–4.6)
LYMPHOCYTES NFR BLD AUTO: 34.9 %
MCH RBC QN AUTO: 32.7 PG (ref 27–31)
MCHC RBC AUTO-ENTMCNC: 34.8 G/DL (ref 33–36)
MCV RBC AUTO: 94 FL (ref 80–94)
MONOCYTES # BLD AUTO: 0.51 X10(3)/MCL (ref 0.1–1.3)
MONOCYTES NFR BLD AUTO: 7.8 %
NEUTROPHILS # BLD AUTO: 3.43 X10(3)/MCL (ref 2.1–9.2)
NEUTROPHILS NFR BLD AUTO: 52.4 %
NRBC BLD AUTO-RTO: 0 %
PLATELET # BLD AUTO: 264 X10(3)/MCL (ref 130–400)
PMV BLD AUTO: 10.2 FL (ref 7.4–10.4)
POTASSIUM SERPL-SCNC: 4.1 MMOL/L (ref 3.5–5.1)
PROT SERPL-MCNC: 8.4 GM/DL (ref 6.4–8.3)
PSA SERPL-MCNC: 1.46 NG/ML
RBC # BLD AUTO: 4.8 X10(6)/MCL (ref 4.7–6.1)
SODIUM SERPL-SCNC: 136 MMOL/L (ref 136–145)
T4 FREE SERPL-MCNC: 0.84 NG/DL (ref 0.7–1.48)
TESTOST SERPL-MCNC: 430.42 NG/DL (ref 240.24–870.68)
TRIGL SERPL-MCNC: 117 MG/DL (ref 34–140)
TSH SERPL-ACNC: 1.79 UIU/ML (ref 0.35–4.94)
VLDLC SERPL CALC-MCNC: 23 MG/DL
WBC # BLD AUTO: 6.54 X10(3)/MCL (ref 4.5–11.5)

## 2025-07-30 PROCEDURE — 84153 ASSAY OF PSA TOTAL: CPT

## 2025-07-30 PROCEDURE — 85025 COMPLETE CBC W/AUTO DIFF WBC: CPT

## 2025-07-30 PROCEDURE — 80061 LIPID PANEL: CPT

## 2025-07-30 PROCEDURE — 86803 HEPATITIS C AB TEST: CPT

## 2025-07-30 PROCEDURE — 84439 ASSAY OF FREE THYROXINE: CPT

## 2025-07-30 PROCEDURE — 82306 VITAMIN D 25 HYDROXY: CPT

## 2025-07-30 PROCEDURE — 83036 HEMOGLOBIN GLYCOSYLATED A1C: CPT

## 2025-07-30 PROCEDURE — 84443 ASSAY THYROID STIM HORMONE: CPT

## 2025-07-30 PROCEDURE — 80053 COMPREHEN METABOLIC PANEL: CPT

## 2025-07-30 PROCEDURE — 82670 ASSAY OF TOTAL ESTRADIOL: CPT

## 2025-07-30 PROCEDURE — 87389 HIV-1 AG W/HIV-1&-2 AB AG IA: CPT

## 2025-07-30 PROCEDURE — 84403 ASSAY OF TOTAL TESTOSTERONE: CPT

## 2025-07-30 PROCEDURE — 36415 COLL VENOUS BLD VENIPUNCTURE: CPT

## 2025-08-04 ENCOUNTER — TELEPHONE (OUTPATIENT)
Dept: INTERNAL MEDICINE | Facility: CLINIC | Age: 40
End: 2025-08-04

## 2025-08-04 NOTE — TELEPHONE ENCOUNTER
Copied from CRM #[id number]. Topic: General Inquiry - Patient Advice  >> Aug 4, 2025  1:55 PM Mayte wrote:  Who Called: Homero Carrier    Caller is requesting assistance/information from provider's office.    Symptoms (please be specific): heart palpitations worsening   How long has patient had these symptoms:  couple months  List of preferred pharmacies on file (remove unneeded): na    Preferred Method of Contact: Phone Call  Patient's Preferred Phone Number on File: [phone]   Best Call Back Number, if different: na  Additional Information: inquiring about lab results; needs excuse for work today since he took off because he thought his appt was today.    Please advise pt

## 2025-08-04 NOTE — TELEPHONE ENCOUNTER
I contacted patient and advised his appt. Is on 8/11/2025. He can ask AYANA Anthony for excuse from coming today ( thinking his appt. Is today) as well as appointment date 8/11/2025  .

## 2025-08-11 ENCOUNTER — OFFICE VISIT (OUTPATIENT)
Dept: INTERNAL MEDICINE | Facility: CLINIC | Age: 40
End: 2025-08-11

## 2025-08-11 VITALS
SYSTOLIC BLOOD PRESSURE: 124 MMHG | HEIGHT: 70 IN | HEART RATE: 85 BPM | DIASTOLIC BLOOD PRESSURE: 85 MMHG | WEIGHT: 315 LBS | BODY MASS INDEX: 45.1 KG/M2 | TEMPERATURE: 99 F

## 2025-08-11 DIAGNOSIS — I44.5 LEFT POSTERIOR FASCICULAR BLOCK (LPFB): Primary | ICD-10-CM

## 2025-08-11 DIAGNOSIS — I10 HYPERTENSION, UNSPECIFIED TYPE: ICD-10-CM

## 2025-08-11 PROCEDURE — 99214 OFFICE O/P EST MOD 30 MIN: CPT | Mod: S$PBB,,, | Performed by: FAMILY MEDICINE

## 2025-08-11 PROCEDURE — 99213 OFFICE O/P EST LOW 20 MIN: CPT | Mod: PBBFAC | Performed by: FAMILY MEDICINE

## 2025-08-11 RX ORDER — LISINOPRIL AND HYDROCHLOROTHIAZIDE 20; 25 MG/1; MG/1
1 TABLET ORAL DAILY
Qty: 90 TABLET | Refills: 3 | Status: SHIPPED | OUTPATIENT
Start: 2025-08-11 | End: 2025-11-09

## 2025-08-13 DIAGNOSIS — I44.5 LEFT POSTERIOR FASCICULAR BLOCK: Primary | ICD-10-CM

## 2025-09-05 ENCOUNTER — OFFICE VISIT (OUTPATIENT)
Dept: UROLOGY | Facility: CLINIC | Age: 40
End: 2025-09-05

## 2025-09-05 VITALS
SYSTOLIC BLOOD PRESSURE: 128 MMHG | WEIGHT: 315 LBS | HEIGHT: 70 IN | TEMPERATURE: 99 F | OXYGEN SATURATION: 95 % | HEART RATE: 101 BPM | BODY MASS INDEX: 45.1 KG/M2 | DIASTOLIC BLOOD PRESSURE: 76 MMHG

## 2025-09-05 DIAGNOSIS — E29.1 HYPOGONADISM IN MALE: Primary | ICD-10-CM

## 2025-09-05 LAB
BILIRUB SERPL-MCNC: NORMAL MG/DL
BLOOD URINE, POC: NORMAL
COLOR, POC UA: YELLOW
GLUCOSE UR QL STRIP: NORMAL
KETONES UR QL STRIP: NORMAL
LEUKOCYTE ESTERASE URINE, POC: NORMAL
NITRITE, POC UA: NORMAL
PH, POC UA: 5.5
PROTEIN, POC: NORMAL
SPECIFIC GRAVITY, POC UA: 1.02
UROBILINOGEN, POC UA: 0.2

## 2025-09-05 PROCEDURE — 99214 OFFICE O/P EST MOD 30 MIN: CPT | Mod: PBBFAC | Performed by: NURSE PRACTITIONER

## 2025-09-05 RX ORDER — TESTOSTERONE CYPIONATE 200 MG/ML
300 INJECTION, SOLUTION INTRAMUSCULAR
Qty: 10 ML | Refills: 1 | Status: SHIPPED | OUTPATIENT
Start: 2025-09-05 | End: 2026-03-06